# Patient Record
Sex: MALE | Race: WHITE | NOT HISPANIC OR LATINO | Employment: OTHER | URBAN - METROPOLITAN AREA
[De-identification: names, ages, dates, MRNs, and addresses within clinical notes are randomized per-mention and may not be internally consistent; named-entity substitution may affect disease eponyms.]

---

## 2019-10-07 ENCOUNTER — HOSPITAL ENCOUNTER (OUTPATIENT)
Facility: HOSPITAL | Age: 54
Setting detail: OBSERVATION
Discharge: HOME | End: 2019-10-08
Attending: INTERNAL MEDICINE | Admitting: INTERNAL MEDICINE

## 2019-10-07 ENCOUNTER — APPOINTMENT (OUTPATIENT)
Dept: CT IMAGING | Facility: HOSPITAL | Age: 54
End: 2019-10-07

## 2019-10-07 DIAGNOSIS — R42 DIZZINESS: Primary | ICD-10-CM

## 2019-10-07 LAB
ALBUMIN SERPL-MCNC: 4.2 G/DL (ref 3.5–4.8)
ALBUMIN/GLOB SERPL: 1.6 G/DL (ref 1–1.7)
ALP SERPL-CCNC: 60 U/L (ref 32–91)
ALT SERPL W P-5'-P-CCNC: 68 U/L (ref 17–63)
ANION GAP SERPL CALCULATED.3IONS-SCNC: 15.4 MMOL/L (ref 5–15)
APTT PPP: 24.6 SECONDS (ref 24–31)
AST SERPL-CCNC: 44 U/L (ref 15–41)
BASOPHILS # BLD AUTO: 0 10*3/MM3 (ref 0–0.2)
BASOPHILS NFR BLD AUTO: 1 % (ref 0–1.5)
BILIRUB SERPL-MCNC: 0.5 MG/DL (ref 0.3–1.2)
BILIRUB UR QL STRIP: NEGATIVE
BUN BLD-MCNC: 12 MG/DL (ref 8–20)
BUN/CREAT SERPL: 13.3 (ref 6.2–20.3)
CALCIUM SPEC-SCNC: 9 MG/DL (ref 8.9–10.3)
CHLORIDE SERPL-SCNC: 100 MMOL/L (ref 101–111)
CLARITY UR: CLEAR
CO2 SERPL-SCNC: 23 MMOL/L (ref 22–32)
COLOR UR: YELLOW
CREAT BLD-MCNC: 0.9 MG/DL (ref 0.7–1.2)
DEPRECATED RDW RBC AUTO: 44.2 FL (ref 37–54)
EOSINOPHIL # BLD AUTO: 0.1 10*3/MM3 (ref 0–0.4)
EOSINOPHIL NFR BLD AUTO: 1.5 % (ref 0.3–6.2)
ERYTHROCYTE [DISTWIDTH] IN BLOOD BY AUTOMATED COUNT: 13.9 % (ref 12.3–15.4)
GFR SERPL CREATININE-BSD FRML MDRD: 88 ML/MIN/1.73
GLOBULIN UR ELPH-MCNC: 2.7 GM/DL (ref 2.5–3.8)
GLUCOSE BLD-MCNC: 302 MG/DL (ref 65–99)
GLUCOSE BLDC GLUCOMTR-MCNC: 145 MG/DL (ref 70–105)
GLUCOSE UR STRIP-MCNC: ABNORMAL MG/DL
HBA1C MFR BLD: 8.8 % (ref 3.5–5.6)
HCT VFR BLD AUTO: 44.3 % (ref 37.5–51)
HGB BLD-MCNC: 15.3 G/DL (ref 13–17.7)
HGB UR QL STRIP.AUTO: NEGATIVE
INR PPP: 1.01 (ref 0.9–1.1)
KETONES UR QL STRIP: NEGATIVE
LEUKOCYTE ESTERASE UR QL STRIP.AUTO: NEGATIVE
LIPASE SERPL-CCNC: 43 U/L (ref 22–51)
LYMPHOCYTES # BLD AUTO: 1.1 10*3/MM3 (ref 0.7–3.1)
LYMPHOCYTES NFR BLD AUTO: 23.5 % (ref 19.6–45.3)
MCH RBC QN AUTO: 31.3 PG (ref 26.6–33)
MCHC RBC AUTO-ENTMCNC: 34.6 G/DL (ref 31.5–35.7)
MCV RBC AUTO: 90.5 FL (ref 79–97)
MONOCYTES # BLD AUTO: 0.3 10*3/MM3 (ref 0.1–0.9)
MONOCYTES NFR BLD AUTO: 7.4 % (ref 5–12)
NEUTROPHILS # BLD AUTO: 3.1 10*3/MM3 (ref 1.7–7)
NEUTROPHILS NFR BLD AUTO: 66.6 % (ref 42.7–76)
NITRITE UR QL STRIP: NEGATIVE
NRBC BLD AUTO-RTO: 0.1 /100 WBC (ref 0–0.2)
PH UR STRIP.AUTO: 5.5 [PH] (ref 5–8)
PLATELET # BLD AUTO: 134 10*3/MM3 (ref 140–450)
PMV BLD AUTO: 9.9 FL (ref 6–12)
POTASSIUM BLD-SCNC: 4.4 MMOL/L (ref 3.6–5.1)
PROT SERPL-MCNC: 6.9 G/DL (ref 6.1–7.9)
PROT UR QL STRIP: NEGATIVE
PROTHROMBIN TIME: 10.6 SECONDS (ref 9.6–11.7)
RBC # BLD AUTO: 4.89 10*6/MM3 (ref 4.14–5.8)
SODIUM BLD-SCNC: 134 MMOL/L (ref 136–144)
SP GR UR STRIP: 1.02 (ref 1–1.03)
TROPONIN I SERPL-MCNC: <0.03 NG/ML (ref 0–0.03)
UROBILINOGEN UR QL STRIP: ABNORMAL
WBC NRBC COR # BLD: 4.7 10*3/MM3 (ref 3.4–10.8)

## 2019-10-07 PROCEDURE — G0378 HOSPITAL OBSERVATION PER HR: HCPCS

## 2019-10-07 PROCEDURE — 0 IOPAMIDOL PER 1 ML: Performed by: INTERNAL MEDICINE

## 2019-10-07 PROCEDURE — 99220 PR INITIAL OBSERVATION CARE/DAY 70 MINUTES: CPT | Performed by: INTERNAL MEDICINE

## 2019-10-07 PROCEDURE — 85610 PROTHROMBIN TIME: CPT | Performed by: INTERNAL MEDICINE

## 2019-10-07 PROCEDURE — 83690 ASSAY OF LIPASE: CPT | Performed by: PHYSICIAN ASSISTANT

## 2019-10-07 PROCEDURE — 93005 ELECTROCARDIOGRAM TRACING: CPT | Performed by: PHYSICIAN ASSISTANT

## 2019-10-07 PROCEDURE — 85730 THROMBOPLASTIN TIME PARTIAL: CPT | Performed by: INTERNAL MEDICINE

## 2019-10-07 PROCEDURE — 81003 URINALYSIS AUTO W/O SCOPE: CPT | Performed by: PHYSICIAN ASSISTANT

## 2019-10-07 PROCEDURE — 70450 CT HEAD/BRAIN W/O DYE: CPT

## 2019-10-07 PROCEDURE — 85025 COMPLETE CBC W/AUTO DIFF WBC: CPT | Performed by: PHYSICIAN ASSISTANT

## 2019-10-07 PROCEDURE — 70496 CT ANGIOGRAPHY HEAD: CPT

## 2019-10-07 PROCEDURE — 80053 COMPREHEN METABOLIC PANEL: CPT | Performed by: PHYSICIAN ASSISTANT

## 2019-10-07 PROCEDURE — 99285 EMERGENCY DEPT VISIT HI MDM: CPT

## 2019-10-07 PROCEDURE — 84484 ASSAY OF TROPONIN QUANT: CPT | Performed by: PHYSICIAN ASSISTANT

## 2019-10-07 PROCEDURE — 70498 CT ANGIOGRAPHY NECK: CPT

## 2019-10-07 PROCEDURE — 83036 HEMOGLOBIN GLYCOSYLATED A1C: CPT | Performed by: PHYSICIAN ASSISTANT

## 2019-10-07 PROCEDURE — 82962 GLUCOSE BLOOD TEST: CPT

## 2019-10-07 RX ORDER — CARVEDILOL 25 MG/1
25 TABLET ORAL 2 TIMES DAILY WITH MEALS
Status: DISCONTINUED | OUTPATIENT
Start: 2019-10-07 | End: 2019-10-08 | Stop reason: HOSPADM

## 2019-10-07 RX ORDER — CLOPIDOGREL BISULFATE 75 MG/1
75 TABLET ORAL DAILY
Status: ON HOLD | COMMUNITY
End: 2019-10-08 | Stop reason: SDUPTHER

## 2019-10-07 RX ORDER — ATORVASTATIN CALCIUM 40 MG/1
40 TABLET, FILM COATED ORAL DAILY
COMMUNITY
End: 2019-10-31

## 2019-10-07 RX ORDER — ATORVASTATIN CALCIUM 40 MG/1
40 TABLET, FILM COATED ORAL DAILY
Status: DISCONTINUED | OUTPATIENT
Start: 2019-10-07 | End: 2019-10-08 | Stop reason: HOSPADM

## 2019-10-07 RX ORDER — SODIUM CHLORIDE 0.9 % (FLUSH) 0.9 %
10 SYRINGE (ML) INJECTION EVERY 12 HOURS SCHEDULED
Status: DISCONTINUED | OUTPATIENT
Start: 2019-10-07 | End: 2019-10-08 | Stop reason: HOSPADM

## 2019-10-07 RX ORDER — ENALAPRIL MALEATE 5 MG/1
10 TABLET ORAL 2 TIMES DAILY
Status: DISCONTINUED | OUTPATIENT
Start: 2019-10-07 | End: 2019-10-08

## 2019-10-07 RX ORDER — ENALAPRILAT 2.5 MG/2ML
1.25 INJECTION INTRAVENOUS EVERY 6 HOURS PRN
Status: DISCONTINUED | OUTPATIENT
Start: 2019-10-07 | End: 2019-10-08 | Stop reason: HOSPADM

## 2019-10-07 RX ORDER — ACETAMINOPHEN 325 MG/1
650 TABLET ORAL EVERY 6 HOURS PRN
Status: DISCONTINUED | OUTPATIENT
Start: 2019-10-07 | End: 2019-10-08 | Stop reason: HOSPADM

## 2019-10-07 RX ORDER — SODIUM CHLORIDE 0.9 % (FLUSH) 0.9 %
10 SYRINGE (ML) INJECTION AS NEEDED
Status: DISCONTINUED | OUTPATIENT
Start: 2019-10-07 | End: 2019-10-08

## 2019-10-07 RX ORDER — ENALAPRIL MALEATE 10 MG/1
10 TABLET ORAL 2 TIMES DAILY
Status: ON HOLD | COMMUNITY
End: 2019-10-08 | Stop reason: SDUPTHER

## 2019-10-07 RX ORDER — SODIUM CHLORIDE 0.9 % (FLUSH) 0.9 %
10 SYRINGE (ML) INJECTION EVERY 12 HOURS SCHEDULED
Status: DISCONTINUED | OUTPATIENT
Start: 2019-10-07 | End: 2019-10-08

## 2019-10-07 RX ORDER — ASPIRIN 81 MG/1
81 TABLET ORAL DAILY
Status: ON HOLD | COMMUNITY
End: 2019-10-08 | Stop reason: SDUPTHER

## 2019-10-07 RX ORDER — SODIUM CHLORIDE 0.9 % (FLUSH) 0.9 %
10 SYRINGE (ML) INJECTION AS NEEDED
Status: DISCONTINUED | OUTPATIENT
Start: 2019-10-07 | End: 2019-10-08 | Stop reason: HOSPADM

## 2019-10-07 RX ORDER — CARVEDILOL 25 MG/1
25 TABLET ORAL 2 TIMES DAILY WITH MEALS
COMMUNITY

## 2019-10-07 RX ADMIN — Medication 10 ML: at 19:52

## 2019-10-07 RX ADMIN — ENALAPRIL MALEATE 10 MG: 5 TABLET ORAL at 19:51

## 2019-10-07 RX ADMIN — SODIUM CHLORIDE 1000 ML: 900 INJECTION, SOLUTION INTRAVENOUS at 10:43

## 2019-10-07 RX ADMIN — IOPAMIDOL 100 ML: 755 INJECTION, SOLUTION INTRAVENOUS at 14:59

## 2019-10-07 RX ADMIN — ACETAMINOPHEN 650 MG: 325 TABLET ORAL at 18:18

## 2019-10-07 RX ADMIN — CARVEDILOL 25 MG: 25 TABLET, FILM COATED ORAL at 18:18

## 2019-10-07 RX ADMIN — ATORVASTATIN CALCIUM 40 MG: 40 TABLET, FILM COATED ORAL at 18:18

## 2019-10-07 RX ADMIN — Medication 10 ML: at 19:53

## 2019-10-07 NOTE — ED PROVIDER NOTES
Subjective   History:  Patient is a 54-year-old male presents to the ER with dizziness and nausea.  He reports he had 2 episodes yesterday that lasted less than 10 seconds each 1 while he was standing walking downstairs the other while he rolled over on the couch.  He reports this morning he woke up and he still felt as though he might get dizzy and passed out.  He also reports associated nausea but no episodes of emesis.  Does report that he has a defibrillator in place.    Onset: 2 days  Location: generalized  Duration: intermittent  Character: dizziness/nausea  Aggravating/Alleviating factors: None  Radiation None  Severity: mild               Review of Systems   Constitutional: Negative.    HENT: Negative.    Respiratory: Negative.    Gastrointestinal: Positive for nausea. Negative for vomiting.   Genitourinary: Negative.    Musculoskeletal: Negative.    Skin: Negative.    Neurological: Positive for dizziness.   Psychiatric/Behavioral: Negative.        Past Medical History:   Diagnosis Date   • Diabetes mellitus (CMS/[facility])    • Heart disease        No Known Allergies    Past Surgical History:   Procedure Laterality Date   • INTERNAL CARDIAC DEFIBRILLATOR INSERTION Left        History reviewed. No pertinent family history.    Social History     Socioeconomic History   • Marital status:      Spouse name: Not on file   • Number of children: Not on file   • Years of education: Not on file   • Highest education level: Not on file   Tobacco Use   • Smoking status: Never Smoker   • Smokeless tobacco: Never Used   Substance and Sexual Activity   • Alcohol use: Yes     Comment: occasional    • Drug use: No   • Sexual activity: Defer           Objective   Physical Exam   Constitutional: He is oriented to person, place, and time. He appears well-developed and well-nourished.   HENT:   Head: Normocephalic and atraumatic.   Eyes: Pupils are equal, round, and reactive to light.   Neck: Normal range of motion.    Cardiovascular: Normal rate and regular rhythm.   No carotid bruits    Pulmonary/Chest: Effort normal and breath sounds normal.   Musculoskeletal: Normal range of motion.   Neurological: He is alert and oriented to person, place, and time. No cranial nerve deficit or sensory deficit. He exhibits normal muscle tone. Coordination normal.   Skin: Skin is warm and dry.   Psychiatric: He has a normal mood and affect. His behavior is normal.       Procedures           ED Course        Ct Head Without Contrast    Result Date: 10/7/2019   1. 6 mm hyperdense lesion in the posterior aspect of the right centrum semiovale. Diagnostic considerations are discussed above. 2. I would recommend an MRI of the brain with and without contrast for follow-up.  Electronically Signed By-Hasmukh Streeter On:10/7/2019 11:01 AM This report was finalized on [id number] by  Hasmukh Streeter, .    Labs Reviewed   COMPREHENSIVE METABOLIC PANEL - Abnormal; Notable for the following components:       Result Value    Glucose 302 (*)     Sodium 134 (*)     Chloride 100 (*)     ALT (SGPT) 68 (*)     AST (SGOT) 44 (*)     Anion Gap 15.4 (*)     All other components within normal limits   URINALYSIS W/ CULTURE IF INDICATED - Abnormal; Notable for the following components:    Glucose, UA >=1000 mg/dL (3+) (*)     All other components within normal limits    Narrative:     Urine microscopic not indicated.   CBC WITH AUTO DIFFERENTIAL - Abnormal; Notable for the following components:    Platelets 134 (*)     All other components within normal limits   LIPASE - Normal   TROPONIN (IN-HOUSE) - Normal    Narrative:     Troponin I Reference Range:    0.00-0.03  Negative.  Repeat testing in 4-6 hours if clinically indicated.    0.04-0.29  Suspicious for myocardial injury. Serial measurements and clinical  correlation may be necessary to confirm or exclude diagnosis of acute  coronary syndrome.  Repeat testing in 4-6 hours if indicated.     >0.29 Consistent with  myocardial injury.  Recommend clinical and laboratory correlation.     Results my be falsely decreased if patient taking Biotin.    CBC AND DIFFERENTIAL    Narrative:     The following orders were created for panel order CBC & Differential.  Procedure                               Abnormality         Status                     ---------                               -----------         ------                     CBC Auto Differential[[id number]]        Abnormal            Final result                 Please view results for these tests on the individual orders.     Medications   sodium chloride 0.9 % flush 10 mL (not administered)   sodium chloride 0.9 % bolus 1,000 mL (0 mL Intravenous Stopped 10/7/19 1200)               MDM  Number of Diagnoses or Management Options  Dizziness:   Diagnosis management comments: DISPOSITION:   Chart Review:  Comorbidity:  has a past medical history of Diabetes mellitus (CMS/[facility]) and Heart disease.  Differentials:this list is not all inclusive and does not constitute the entirety of considered causes --> Intracranial abnormality, CAD, hypotension,  ECG: interpreted by ER physician and reviewed by myself: Sinus rhythm  Labs: Glucose 302    Imaging: Was interpreted by physician and reviewed by myself:  Ct Head Without Contrast    Result Date: 10/7/2019   1. 6 mm hyperdense lesion in the posterior aspect of the right centrum semiovale. Diagnostic considerations are discussed above. 2. I would recommend an MRI of the brain with and without contrast for follow-up.  Electronically Signed By-Hasmukh Streeter On:10/7/2019 11:01 AM This report was finalized on [id number] by  Hasmukh Streeter, .      Disposition/Treatment:  Patient is a 54-year-old male presents to the ER with dizziness and headache for the past 2 days.  Patient's orthostats were negative.  Patient had a CT of his head which showed a 6 mm hyperdense lesion.  Dr. Henderson and I evaluated the patient he had no neurological deficits.   Patient's defibrillator is not MRI compatible.  I spoke with Dr. Simpson who recommended admittance with CT of his head and repeat in the morning. He will follow the patient in the hospital.  He was admitted to Dr. Nascimento patient was stable and in agreement with plan.       Amount and/or Complexity of Data Reviewed  Clinical lab tests: reviewed  Tests in the radiology section of CPT®: reviewed  Tests in the medicine section of CPT®: reviewed    Patient Progress  Patient progress: stable      Final diagnoses:   Dizziness              Julieta Noland PA-C  10/07/19 1412       Julieta Noland PA-C  10/07/19 1415

## 2019-10-07 NOTE — PLAN OF CARE
Dr Garcia is aware of patient and has reviewed the CT Head, CTA head and CTA neck. Pt has what appears to be a small intracranial bleed. We will follow with a repeat CT Head in the morning and will evaluate the patient in the morning.  Please call on call Neurosurgery service for any change in mental status and obtain a repeat CT Head.

## 2019-10-07 NOTE — H&P
[facility] Medicine Services            Primary Care Provider:  Rogerio Gordillo MD    Patient Care Team:  Rogerio Gordillo MD as PCP - General    CHIEF COMPLAINT:     Chief Complaint   Patient presents with   • Dizziness       Dizziness    HISTORY OF PRESENT ILLNESS:    HPI  54-year-old male with history of   hypertension  Dyslipidemia  CAD  CHF  DM2  Status post ICD    Presenting with dizziness without spinning sensation, started yesterday still with nausea without vomiting.  Denies any headache ear symptoms or tenderness.  He had an episodes lasting about 10 seconds.  Postinfectious his change of position from sitting position and lying down in bed with movement in the bed.  He reports no use of anticoagulants but he takes aspirin Plavix for cardiac disease.  He has pacemaker/ICD in place.  He denies any chest symptoms.  CT head in the ER showed evidence of6 mm hyperdense lesion in the posterior aspect of the right centrum  semiovale.  CT angiogram showed bibasilar tip aneurysm 5.5 mm  Neurologist Dr. Nobles called from ER who recommended admission for further evaluation morning with repeat CT scan.  He tolerates hemorrhagic stroke.  Patient cannot do MRI secondary to ICD/pacemaker  Patient denies abdominal pain or vomiting or diarrhea.  He reports taking only metformin today.  He reports that he has not taken his aspirin or Plavix today.    Past Medical History:   Diagnosis Date   • Diabetes mellitus (CMS/H[facility])    • Heart disease        Past Surgical History:   Procedure Laterality Date   • INTERNAL CARDIAC DEFIBRILLATOR INSERTION Left        History reviewed. No pertinent family history.    Social History     Tobacco Use   • Smoking status: Never Smoker   • Smokeless tobacco: Never Used   Substance Use Topics   • Alcohol use: Yes     Comment: occasional    • Drug use: No       Medications Prior to Admission   Medication Sig Dispense Refill Last Dose   • aspirin 81 MG EC tablet Take  "81 mg by mouth Daily.   10/6/2019 at Unknown time   • atorvastatin (LIPITOR) 40 MG tablet Take 40 mg by mouth Daily.   10/6/2019 at Unknown time   • carvedilol (COREG) 25 MG tablet Take 25 mg by mouth 2 (Two) Times a Day With Meals.   10/6/2019 at Unknown time   • clopidogrel (PLAVIX) 75 MG tablet Take 75 mg by mouth Daily.   10/6/2019 at Unknown time   • enalapril (VASOTEC) 10 MG tablet Take 10 mg by mouth 2 (Two) Times a Day.   10/6/2019 at Unknown time   • metFORMIN (GLUCOPHAGE) 500 MG tablet Take 500 mg by mouth 2 (Two) Times a Day With Meals.   10/7/2019 at Unknown time       Allergies:  Patient has no known allergies.      There is no immunization history on file for this patient.        REVIEW OF SYSTEMS:     ROS    Vital Signs  Temp:  [97.8 °F (36.6 °C)] 97.8 °F (36.6 °C)  Heart Rate:  [69-77] 74  Resp:  [18] 18  BP: (108-129)/(66-82) 115/71    Flowsheet Rows      First Filed Value   Admission Height  170.2 cm (67\") Documented at 10/07/2019 1001   Admission Weight  95.9 kg (211 lb 6.7 oz) Documented at 10/07/2019 1001           Physical Exam:    Physical Exam   Constitutional: He is oriented to person, place, and time. He appears well-developed and well-nourished. No distress.   HENT:   Head: Normocephalic and atraumatic.   Right Ear: External ear normal.   Left Ear: External ear normal.   Nose: Nose normal.   Mouth/Throat: Oropharynx is clear and moist. No oropharyngeal exudate.   Eyes: Conjunctivae and EOM are normal. Pupils are equal, round, and reactive to light. Right eye exhibits no discharge. Left eye exhibits no discharge. No scleral icterus.   Neck: Normal range of motion. No JVD present. No tracheal deviation present. No thyromegaly present.   Cardiovascular: Normal rate, regular rhythm, normal heart sounds and intact distal pulses. Exam reveals no gallop and no friction rub.   No murmur heard.  Pulmonary/Chest: Effort normal and breath sounds normal. No stridor. No respiratory distress. He has no " wheezes. He has no rales. He exhibits no tenderness.   Abdominal: Soft. Bowel sounds are normal. He exhibits no distension and no mass. There is no tenderness. There is no rebound and no guarding. No hernia.   Musculoskeletal: Normal range of motion. He exhibits no edema, tenderness or deformity.   Lymphadenopathy:     He has no cervical adenopathy.   Neurological: He is alert and oriented to person, place, and time. No cranial nerve deficit or sensory deficit. He exhibits normal muscle tone. Coordination normal.   Skin: Skin is warm and dry. No rash noted. He is not diaphoretic. No erythema.   Psychiatric: He has a normal mood and affect. His behavior is normal.   Nursing note and vitals reviewed.      Emotional Behavior:   Normal   Debilities:  Age appropriate      Results Review:      I reviewed the patient's new clinical results.    Lab Results (most recent)     Procedure Component Value Units Date/Time    Hemoglobin A1c [[id number]]  (Abnormal) Collected:  10/07/19 1022    Specimen:  Blood Updated:  10/07/19 1539     Hemoglobin A1C 8.8 %     Narrative:       Hemoglobin A1C Reference Range:    <5.7 %        Normal  5.7-6.4 %     Increased risk for diabetes  > 6.4 %        Diabetes       These guidelines have been recommended by the American Diabetic Association for Hgb A1c.      The following 2010 guidelines have been recommended by the American Diabetes Association for Hemoglobin A1c.    HBA1c 5.7-6.4% Increased risk for future diabetes (pre-diabetes)  HBA1c     >6.4% Diabetes    Urinalysis With Culture If Indicated - Urine, Clean Catch [[id number]]  (Abnormal) Collected:  10/07/19 1031    Specimen:  Urine, Clean Catch Updated:  10/07/19 1102     Color, UA Yellow     Appearance, UA Clear     pH, UA 5.5     Specific Gravity, UA 1.018     Glucose, UA >=1000 mg/dL (3+)     Ketones, UA Negative     Bilirubin, UA Negative     Blood, UA Negative     Protein, UA Negative     Leuk Esterase, UA Negative     Nitrite, UA  Negative     Urobilinogen, UA 0.2 E.U./dL    Narrative:       Urine microscopic not indicated.    Troponin [[id number]]  (Normal) Collected:  10/07/19 1022    Specimen:  Blood Updated:  10/07/19 1054     Troponin I <0.030 ng/mL     Narrative:       Troponin I Reference Range:    0.00-0.03  Negative.  Repeat testing in 4-6 hours if clinically indicated.    0.04-0.29  Suspicious for myocardial injury. Serial measurements and clinical  correlation may be necessary to confirm or exclude diagnosis of acute  coronary syndrome.  Repeat testing in 4-6 hours if indicated.     >0.29 Consistent with myocardial injury.  Recommend clinical and laboratory correlation.     Results my be falsely decreased if patient taking Biotin.     Comprehensive Metabolic Panel [[id number]]  (Abnormal) Collected:  10/07/19 1022    Specimen:  Blood Updated:  10/07/19 1053     Glucose 302 mg/dL      BUN 12 mg/dL      Creatinine 0.90 mg/dL      Sodium 134 mmol/L      Potassium 4.4 mmol/L      Chloride 100 mmol/L      CO2 23.0 mmol/L      Calcium 9.0 mg/dL      Total Protein 6.9 g/dL      Albumin 4.20 g/dL      ALT (SGPT) 68 U/L      AST (SGOT) 44 U/L      Alkaline Phosphatase 60 U/L      Total Bilirubin 0.5 mg/dL      eGFR Non African Amer 88 mL/min/1.73      Globulin 2.7 gm/dL      A/G Ratio 1.6 g/dL      BUN/Creatinine Ratio 13.3     Anion Gap 15.4 mmol/L     Lipase [[id number]]  (Normal) Collected:  10/07/19 1022    Specimen:  Blood Updated:  10/07/19 1053     Lipase 43 U/L     CBC & Differential [[id number]] Collected:  10/07/19 1022    Specimen:  Blood Updated:  10/07/19 1034    Narrative:       The following orders were created for panel order CBC & Differential.  Procedure                               Abnormality         Status                     ---------                               -----------         ------                     CBC Auto Differential[[id number]]        Abnormal            Final result                 Please view results for  these tests on the individual orders.    CBC Auto Differential [[id number]]  (Abnormal) Collected:  10/07/19 1022    Specimen:  Blood Updated:  10/07/19 1034     WBC 4.70 10*3/mm3      RBC 4.89 10*6/mm3      Hemoglobin 15.3 g/dL      Hematocrit 44.3 %      MCV 90.5 fL      MCH 31.3 pg      MCHC 34.6 g/dL      RDW 13.9 %      RDW-SD 44.2 fl      MPV 9.9 fL      Platelets 134 10*3/mm3      Neutrophil % 66.6 %      Lymphocyte % 23.5 %      Monocyte % 7.4 %      Eosinophil % 1.5 %      Basophil % 1.0 %      Neutrophils, Absolute 3.10 10*3/mm3      Lymphocytes, Absolute 1.10 10*3/mm3      Monocytes, Absolute 0.30 10*3/mm3      Eosinophils, Absolute 0.10 10*3/mm3      Basophils, Absolute 0.00 10*3/mm3      nRBC 0.1 /100 WBC           Imaging Results (most recent)     Procedure Component Value Units Date/Time    CT Angiogram Head With & Without Contrast [[id number]] Updated:  10/07/19 1458    CT Angiogram Neck With & Without Contrast [[id number]] Updated:  10/07/19 1458    CT Head Without Contrast [[id number]] Collected:  10/07/19 1058     Updated:  10/07/19 1104    Narrative:          DATE OF EXAM:  10/7/2019 10:48 AM     PROCEDURE:   CT HEAD WO CONTRAST-     INDICATIONS:   Syncopal episode, nausea and dizziness, hypertension.     COMPARISON:  No Comparisons Available     TECHNIQUE:   Routine transaxial cuts were obtained through the head without the  administration of contrast. Automated exposure control and iterative  reconstruction methods were used.      FINDINGS:  The cerebral sulci, fissures, ventricles, and basal cisterns are within  range of normal. There is a 6 mm hyperdense lesion in the posterior  aspect of the right centrum semiovale. This is in the deep white matter  of the right parietal lobe. There is no surrounding vasogenic edema.  This could represent a dystrophic calcification from a previous ischemic  event, a small acute hyperdense hemorrhage, or a neoplastic process such  as lymphoma or a neuroendocrine  tumor. I would recommend an MRI of the  brain with and without contrast for follow-up. There is no midline shift  or suspicious extra-axial fluid collections. The orbital contents are  normal. The paranasal and mastoid sinuses are clear. The calvarium is  unremarkable.        Impression:          1. 6 mm hyperdense lesion in the posterior aspect of the right centrum  semiovale. Diagnostic considerations are discussed above.  2. I would recommend an MRI of the brain with and without contrast for  follow-up.     Electronically Signed By-Hasmukh Streeter On:10/7/2019 11:01 AM  This report was finalized on [id number] by  Hasmukh Streeter, .        reviewed    ECG/EMG Results (most recent)     Procedure Component Value Units Date/Time    ECG 12 Lead [[id number]] Collected:  10/07/19 1023     Updated:  10/07/19 1025    Narrative:       HEART RATE= 70  bpm  RR Interval= 856  ms  WV Interval= 147  ms  P Horizontal Axis= 12  deg  P Front Axis= 35  deg  QRSD Interval= 108  ms  QT Interval= 406  ms  QRS Axis= 35  deg  T Wave Axis= -26  deg  - ABNORMAL ECG -  Sinus rhythm  Inferior infarct, old  Electronically Signed By:   Date and Time of Study: 2019-10-07 10:23:48        reviewed              CT Head Without Contrast  Narrative:    DATE OF EXAM:  10/7/2019 10:48 AM     PROCEDURE:   CT HEAD WO CONTRAST-     INDICATIONS:   Syncopal episode, nausea and dizziness, hypertension.     COMPARISON:  No Comparisons Available     TECHNIQUE:   Routine transaxial cuts were obtained through the head without the  administration of contrast. Automated exposure control and iterative  reconstruction methods were used.      FINDINGS:  The cerebral sulci, fissures, ventricles, and basal cisterns are within  range of normal. There is a 6 mm hyperdense lesion in the posterior  aspect of the right centrum semiovale. This is in the deep white matter  of the right parietal lobe. There is no surrounding vasogenic edema.  This could represent a dystrophic  calcification from a previous ischemic  event, a small acute hyperdense hemorrhage, or a neoplastic process such  as lymphoma or a neuroendocrine tumor. I would recommend an MRI of the  brain with and without contrast for follow-up. There is no midline shift  or suspicious extra-axial fluid collections. The orbital contents are  normal. The paranasal and mastoid sinuses are clear. The calvarium is  unremarkable.      Impression:    1. 6 mm hyperdense lesion in the posterior aspect of the right centrum  semiovale. Diagnostic considerations are discussed above.  2. I would recommend an MRI of the brain with and without contrast for  follow-up.     Electronically Signed By-Hasmukh Streeter On:10/7/2019 11:01 AM  This report was finalized on [id number] by  Hasmukh Streeter, .      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  Hold off on aspirin/Plavix  Stat PT/INR  Repeat CT scan in the morning  Monitor neurological status closely per protocol  Consult neurosurgery neurologist  DVT prophylaxis with SCD  Discussed with patient and family    Víctor Nascimento MD  10/07/19  3:42 PM

## 2019-10-07 NOTE — PLAN OF CARE
Problem: Patient Care Overview  Goal: Plan of Care Review  Outcome: Ongoing (interventions implemented as appropriate)   10/07/19 1741   Coping/Psychosocial   Plan of Care Reviewed With patient   Plan of Care Review   Progress improving   OTHER   Outcome Summary Patient admitted to unit today       Problem: Stroke (Hemorrhagic) (Adult)  Goal: Signs and Symptoms of Listed Potential Problems Will be Absent, Minimized or Managed (Stroke)  Outcome: Ongoing (interventions implemented as appropriate)   10/07/19 1741   Goal/Outcome Evaluation   Problems Assessed (Stroke (Hemorrhagic)) all   Problems Present (Stroke (Hemorrhagic)) none

## 2019-10-08 ENCOUNTER — APPOINTMENT (OUTPATIENT)
Dept: CT IMAGING | Facility: HOSPITAL | Age: 54
End: 2019-10-08

## 2019-10-08 ENCOUNTER — HOSPITAL ENCOUNTER (OUTPATIENT)
Dept: CARDIOLOGY | Facility: HOSPITAL | Age: 54
Discharge: HOME | End: 2019-10-08

## 2019-10-08 VITALS
HEIGHT: 67 IN | DIASTOLIC BLOOD PRESSURE: 66 MMHG | BODY MASS INDEX: 32.96 KG/M2 | WEIGHT: 210 LBS | SYSTOLIC BLOOD PRESSURE: 92 MMHG

## 2019-10-08 VITALS
DIASTOLIC BLOOD PRESSURE: 71 MMHG | OXYGEN SATURATION: 94 % | TEMPERATURE: 97.4 F | HEIGHT: 67 IN | WEIGHT: 210 LBS | BODY MASS INDEX: 32.96 KG/M2 | HEART RATE: 68 BPM | SYSTOLIC BLOOD PRESSURE: 128 MMHG | RESPIRATION RATE: 16 BRPM

## 2019-10-08 LAB
ALBUMIN SERPL-MCNC: 3.7 G/DL (ref 3.5–4.8)
ALBUMIN/GLOB SERPL: 1.6 G/DL (ref 1–1.7)
ALP SERPL-CCNC: 54 U/L (ref 32–91)
ALT SERPL W P-5'-P-CCNC: 67 U/L (ref 17–63)
ANION GAP SERPL CALCULATED.3IONS-SCNC: 15.9 MMOL/L (ref 5–15)
ARTICHOKE IGE QN: 164 MG/DL (ref 0–100)
AST SERPL-CCNC: 51 U/L (ref 15–41)
BASOPHILS # BLD AUTO: 0 10*3/MM3 (ref 0–0.2)
BASOPHILS NFR BLD AUTO: 0.6 % (ref 0–1.5)
BILIRUB SERPL-MCNC: 0.6 MG/DL (ref 0.3–1.2)
BUN BLD-MCNC: 13 MG/DL (ref 8–20)
BUN/CREAT SERPL: 14.4 (ref 6.2–20.3)
CALCIUM SPEC-SCNC: 8.6 MG/DL (ref 8.9–10.3)
CHLORIDE SERPL-SCNC: 99 MMOL/L (ref 101–111)
CHOLEST SERPL-MCNC: 349 MG/DL
CO2 SERPL-SCNC: 25 MMOL/L (ref 22–32)
CREAT BLD-MCNC: 0.9 MG/DL (ref 0.7–1.2)
DEPRECATED RDW RBC AUTO: 44.2 FL (ref 37–54)
EOSINOPHIL # BLD AUTO: 0.1 10*3/MM3 (ref 0–0.4)
EOSINOPHIL NFR BLD AUTO: 1.9 % (ref 0.3–6.2)
ERYTHROCYTE [DISTWIDTH] IN BLOOD BY AUTOMATED COUNT: 13.9 % (ref 12.3–15.4)
GFR SERPL CREATININE-BSD FRML MDRD: 88 ML/MIN/1.73
GLOBULIN UR ELPH-MCNC: 2.3 GM/DL (ref 2.5–3.8)
GLUCOSE BLD-MCNC: 166 MG/DL (ref 65–99)
GLUCOSE BLDC GLUCOMTR-MCNC: 192 MG/DL (ref 70–105)
GLUCOSE BLDC GLUCOMTR-MCNC: 220 MG/DL (ref 70–105)
HCT VFR BLD AUTO: 39.2 % (ref 37.5–51)
HDLC SERPL QL: 10.91
HDLC SERPL-MCNC: 32 MG/DL
HGB BLD-MCNC: 13.9 G/DL (ref 13–17.7)
LDLC/HDLC SERPL: 3.76 {RATIO}
LYMPHOCYTES # BLD AUTO: 1.8 10*3/MM3 (ref 0.7–3.1)
LYMPHOCYTES NFR BLD AUTO: 35 % (ref 19.6–45.3)
MCH RBC QN AUTO: 32 PG (ref 26.6–33)
MCHC RBC AUTO-ENTMCNC: 35.5 G/DL (ref 31.5–35.7)
MCV RBC AUTO: 90 FL (ref 79–97)
MONOCYTES # BLD AUTO: 0.4 10*3/MM3 (ref 0.1–0.9)
MONOCYTES NFR BLD AUTO: 8 % (ref 5–12)
NEUTROPHILS # BLD AUTO: 2.9 10*3/MM3 (ref 1.7–7)
NEUTROPHILS NFR BLD AUTO: 54.5 % (ref 42.7–76)
NRBC BLD AUTO-RTO: 0.1 /100 WBC (ref 0–0.2)
PLATELET # BLD AUTO: 127 10*3/MM3 (ref 140–450)
PMV BLD AUTO: 9.9 FL (ref 6–12)
POTASSIUM BLD-SCNC: 3.9 MMOL/L (ref 3.6–5.1)
PROT SERPL-MCNC: 6 G/DL (ref 6.1–7.9)
RBC # BLD AUTO: 4.35 10*6/MM3 (ref 4.14–5.8)
SODIUM BLD-SCNC: 136 MMOL/L (ref 136–144)
TRIGL SERPL-MCNC: 983 MG/DL
TSH SERPL DL<=0.05 MIU/L-ACNC: 3.37 UIU/ML (ref 0.34–5.6)
VIT B12 BLD-MCNC: 204 PG/ML (ref 180–914)
VLDLC SERPL-MCNC: 196.6 MG/DL
WBC NRBC COR # BLD: 5.3 10*3/MM3 (ref 3.4–10.8)

## 2019-10-08 PROCEDURE — 70450 CT HEAD/BRAIN W/O DYE: CPT

## 2019-10-08 PROCEDURE — 82607 VITAMIN B-12: CPT | Performed by: INTERNAL MEDICINE

## 2019-10-08 PROCEDURE — 84443 ASSAY THYROID STIM HORMONE: CPT | Performed by: INTERNAL MEDICINE

## 2019-10-08 PROCEDURE — G0378 HOSPITAL OBSERVATION PER HR: HCPCS

## 2019-10-08 PROCEDURE — 82962 GLUCOSE BLOOD TEST: CPT

## 2019-10-08 PROCEDURE — 92523 SPEECH SOUND LANG COMPREHEN: CPT

## 2019-10-08 PROCEDURE — 99217 PR OBSERVATION CARE DISCHARGE MANAGEMENT: CPT | Performed by: INTERNAL MEDICINE

## 2019-10-08 PROCEDURE — 97161 PT EVAL LOW COMPLEX 20 MIN: CPT

## 2019-10-08 PROCEDURE — 96372 THER/PROPH/DIAG INJ SC/IM: CPT

## 2019-10-08 PROCEDURE — 93306 TTE W/DOPPLER COMPLETE: CPT

## 2019-10-08 PROCEDURE — 99204 OFFICE O/P NEW MOD 45 MIN: CPT | Performed by: PHYSICIAN ASSISTANT

## 2019-10-08 PROCEDURE — 80061 LIPID PANEL: CPT | Performed by: INTERNAL MEDICINE

## 2019-10-08 PROCEDURE — 80053 COMPREHEN METABOLIC PANEL: CPT | Performed by: INTERNAL MEDICINE

## 2019-10-08 PROCEDURE — 25010000002 CYANOCOBALAMIN PER 1000 MCG: Performed by: NURSE PRACTITIONER

## 2019-10-08 PROCEDURE — 99214 OFFICE O/P EST MOD 30 MIN: CPT | Performed by: NURSE PRACTITIONER

## 2019-10-08 PROCEDURE — 25010000002 SULFUR HEXAFLUORIDE MICROSPH 60.7-25 MG RECONSTITUTED SUSPENSION: Performed by: INTERNAL MEDICINE

## 2019-10-08 PROCEDURE — 63710000001 INSULIN LISPRO (HUMAN) PER 5 UNITS: Performed by: INTERNAL MEDICINE

## 2019-10-08 PROCEDURE — 85025 COMPLETE CBC W/AUTO DIFF WBC: CPT | Performed by: INTERNAL MEDICINE

## 2019-10-08 RX ORDER — ENALAPRIL MALEATE 5 MG/1
10 TABLET ORAL
Status: DISCONTINUED | OUTPATIENT
Start: 2019-10-09 | End: 2019-10-08 | Stop reason: HOSPADM

## 2019-10-08 RX ORDER — NICOTINE POLACRILEX 4 MG
15 LOZENGE BUCCAL
Status: DISCONTINUED | OUTPATIENT
Start: 2019-10-08 | End: 2019-10-08 | Stop reason: HOSPADM

## 2019-10-08 RX ORDER — DEXTROSE MONOHYDRATE 25 G/50ML
25 INJECTION, SOLUTION INTRAVENOUS
Status: DISCONTINUED | OUTPATIENT
Start: 2019-10-08 | End: 2019-10-08 | Stop reason: HOSPADM

## 2019-10-08 RX ORDER — ENALAPRIL MALEATE 10 MG/1
10 TABLET ORAL DAILY
Start: 2019-10-08 | End: 2021-11-01

## 2019-10-08 RX ORDER — ASPIRIN 81 MG/1
81 TABLET ORAL DAILY
Start: 2019-10-09

## 2019-10-08 RX ORDER — CYANOCOBALAMIN 1000 UG/ML
1000 INJECTION, SOLUTION INTRAMUSCULAR; SUBCUTANEOUS ONCE
Status: COMPLETED | OUTPATIENT
Start: 2019-10-08 | End: 2019-10-08

## 2019-10-08 RX ORDER — LANOLIN ALCOHOL/MO/W.PET/CERES
1000 CREAM (GRAM) TOPICAL DAILY
Status: DISCONTINUED | OUTPATIENT
Start: 2019-10-09 | End: 2019-10-08 | Stop reason: HOSPADM

## 2019-10-08 RX ORDER — CLOPIDOGREL BISULFATE 75 MG/1
75 TABLET ORAL DAILY
Start: 2019-10-09 | End: 2022-06-30 | Stop reason: SDUPTHER

## 2019-10-08 RX ADMIN — CYANOCOBALAMIN 1000 MCG: 1000 INJECTION, SOLUTION INTRAMUSCULAR at 13:28

## 2019-10-08 RX ADMIN — Medication 10 ML: at 08:26

## 2019-10-08 RX ADMIN — ATORVASTATIN CALCIUM 40 MG: 40 TABLET, FILM COATED ORAL at 08:22

## 2019-10-08 RX ADMIN — ENALAPRIL MALEATE 10 MG: 5 TABLET ORAL at 08:23

## 2019-10-08 RX ADMIN — ACETAMINOPHEN 650 MG: 325 TABLET ORAL at 08:22

## 2019-10-08 RX ADMIN — INSULIN LISPRO 3 UNITS: 100 INJECTION, SOLUTION INTRAVENOUS; SUBCUTANEOUS at 13:28

## 2019-10-08 RX ADMIN — CARVEDILOL 25 MG: 25 TABLET, FILM COATED ORAL at 08:22

## 2019-10-08 RX ADMIN — SULFUR HEXAFLUORIDE 2 ML: KIT at 15:30

## 2019-10-08 NOTE — THERAPY EVALUATION
Acute Care - Speech Language Pathology Initial Evaluation   Adriel     Patient Name: Herve Gill  : 1965  MRN: [id number]  Today's Date: 10/8/2019               Admit Date: 10/7/2019     Visit Dx:    ICD-10-CM ICD-9-CM   1. Dizziness R42 780.4     Patient Active Problem List   Diagnosis   • Dizziness     Past Medical History:   Diagnosis Date   • Diabetes mellitus (CMS/HCC)    • Heart disease      Past Surgical History:   Procedure Laterality Date   • INTERNAL CARDIAC DEFIBRILLATOR INSERTION Left         SLP EVALUATION (last 72 hours)      SLP SLC Evaluation     Row Name 10/08/19 1500          Patient Profile Reviewed  yes  -    Pertinent History Of Current Problem  Pt admitted with dizziness, CT noted to demonstrate possible micro hemorrhage. Neurosurgery consulted with no surgical interventions recommended at this time, recommending outpatient follow up. Pt described primary symptoms to incude dizziness & headache.     -    Prior Level of Function-Communication  WFL  -    Plans/Goals Discussed with  patient;spouse/S.O.;patient and family  -    Patient's Goals for Discharge  return to home  -          Additional Documentation  Pain Scale: Numbers Pre/Post-Treatment (Group)  -          Pain Scale: Numbers, Pretreatment  0/10 - no pain  -    Pain Scale: Numbers, Post-Treatment  0/10 - no pain  -          Pain: FACES Scale, Pretreatment  --  -          Comprehension Assessment/Intervention  Auditory Comprehension  -          Auditory Comprehension (Communication)  WNL  -    Auditory Comprehension Communication, Comment  Pt follows 2-step complex commands. Appropriate to conversation. No indications receptive aphasia  -          Expression Assessment/Intervention  verbal expression  -          Verbal Expression  WNL  -    Verbal Expression, Comment  Appropriate to conversation. No paraphasias, neologisms, or other indications expressive/Broca's aphasia.   -          Oral  Motor Structure and Function  WNL  -          Oral Motor General Assessment  WFL  -    Oral Motor, Comment  No oral  motor weakness or asymmetry  -          Motor Speech Function  WNL  -    Motor Speech, Comment  No consonant imprecision or dysarthria. NIH remains 0. Pt completed diadochokinetic productions without difficulty. Conversational speech intelligibility at 100%   -          Cognitive Function (Cognition)  WNL  -    Cognition, Comment  Immediate & delayed recall 100% accurate. Pt is extensively oriented (self, date, month, year, place, specific medical situation). Pt answered multi-step problem solving questions for time & medication management related to ADLs with 100% accuracy. Single error was independendently self corrected. Pt reports daily ADLs to include driving, taking child to school, cooking. Pt completed a verbal sequencing task for cooking with verbose speech and 100% accuracy. No cognitive deficits identified upon informal testing at this time. Pt was informed regarding different presentations of cognitive linguistic deficits that can occur once returning to ADLs & he was informed that outpatient ST services are available if issues identified once return to home.   -          SLP Diagnosis  Pt demonstrates speech, language, & cognitive linguistic skills which appear WFL on informal testing this date. No skilled ST appears warranted at this time. Please reconsult if services indicated  -[facility] Criteria for Skilled Therapy Interventions Met  no problems identified which require skilled intervention  -          Anticipated Dischage Disposition  home  -          Discharge Destination  home  -      User Key  (r) = Recorded By, (t) = Taken By, (c) = Cosigned By    Initials Name Effective Dates    Chelita Novoa, SLP 03/01/19 -              EDUCATION  The patient has been educated in the following areas:   Rationale for evaluation & evaluation findings.     SLP  Recommendation and Plan  SLP Diagnosis: Pt demonstrates speech, language, & cognitive linguistic skills which appear WFL on informal testing this date. No skilled ST appears warranted at this time. Please reconsult if services indicated     SLC Criteria for Skilled Therapy Interventions Met: no problems identified which require skilled intervention  Anticipated Dischage Disposition: home                           Chelita Carmichael, SLP  10/8/2019

## 2019-10-08 NOTE — CONSULTS
"Diabetes Education  Assessment/Teaching    Patient Name:  Herve Gill  YOB: 1965  MRN: [id number]  Admit Date:  10/7/2019      Assessment Date:  10/8/2019    Most Recent Value   General Information    Referral From:  MD order, A1c [Consult received due to stroke protocol and pt's A1c was 8.8% on 10/7/2019.]   Height  170.2 cm (67\")   Height Method  Stated   Weight  95.3 kg (210 lb)   Weight Method  Bed scale   Pregnancy Assessment   Diabetes History   What type of diabetes do you have?  Type 2   Length of Diabetes Diagnosis  Newly diagnosed <6 months   Current DM knowledge  poor   Have you had diabetes education/teaching in the past?  no   Do you test your blood sugar at home?  no   Have You Felt Down, Depressed or Hopeless?  no   Have You Felt Little Interest or Pleasure in Doing Things?  no   Education Preferences   What areas of diabetes would you like to learn about?  avoiding high blood sugar, avoiding low blood sugar, diabetes complications, exercise information, medications for diabetes, testing my blood sugar at home   Nutrition Information   Assessment Topics   Healthy Eating - Assessment  -- [Pt states RD has seen patient for meal planning education.]   Being Active - Assessment  Needs education   Taking Medication - Assessment  Needs education   Problem Solving - Assessment  Needs education   Reducing Risk - Assessment  Needs education   Monitoring - Assessment  Needs education   DM Goals   Taking Medication - Goal  Today   Problem Solving - Goal  Today   Reducing Risk - Goal  Today   Monitoring - Goal  Today            Most Recent Value   DM Education Needs   Meter  Needs meter   Meter Type  Accuchek [Gave pt the Accuchek Guide Me bs meter and instructed pt in use of meter. He states has been using a friend's meter to check bs some so he does not feel it is necessary to perform return demo. He veralized understanding of use of meter.]   Frequency of Testing  -- [Discussed importance " of checking bs at least 2-3 times/day and to rotate the times (ac and hs). Gave log book to record bs.]   Blood Glucose Target Range  Discussed pt's A1c result of 8.8%. Reviewd diagnostic criteria for DM. Pt states he had only been told he had prediabetes in the past. Discussed diabetes disease process. Reviewed healthy A1c target and healthy bs range.   Medication  Oral [Pt states had been started on metformin in the past for prediabetes but was not taking as prescribed. Pt is being d/cd on metformin 500 mg bid. Pt states will now take as prescribed.]   Problem Solving  Hypoglycemia, Hyperglycemia, Signs, Symptoms, Treatment   Reducing Risks  A1C testing   Physical Activity  -- [Discussed importance of exercise in helping control bs. Pt states is active. Discussed getting 150 min of activity in each week as long as MD approves.]   Motivation  Strong   Teaching Method  Explanation, Discussion, Demonstration   Patient Response  Verbalized understanding, Demonstrates adequately [Pt did insert test strip into meter correctly and loaded lancet drum into lancing device correctly. ]            Other Comments:  Discussed how meal plan and exercise are patient's main tx of DM and the metformin is a secondary tx. Discussed importance of follow up with MD. Pt states he will check bs 2-3 times/day and record bs and will take bs log to MD office. Gave A1c info sheet and First Steps booklet. Pt verbalized understanding of info covered. He has been d/cd and states will get rxs for test strips and lancets from PCP.         Electronically signed by:  Mayela Cummins RN  10/08/19 5:29 PM

## 2019-10-08 NOTE — THERAPY EVALUATION
Acute Care - Physical Therapy Initial Evaluation   Adriel     Patient Name: Herve Gill  : 1965  MRN: [id number]  Today's Date: 10/8/2019                Admit Date: 10/7/2019    Visit Dx:     ICD-10-CM ICD-9-CM   1. Dizziness R42 780.4     Patient Active Problem List   Diagnosis   • Dizziness     Past Medical History:   Diagnosis Date   • Diabetes mellitus (CMS/HCC)    • Heart disease      Past Surgical History:   Procedure Laterality Date   • INTERNAL CARDIAC DEFIBRILLATOR INSERTION Left         PT ASSESSMENT (last 12 hours)      Physical Therapy Evaluation     Row Name 10/08/19 171          PT Evaluation Time/Intention    Document Type  evaluation  -SS     Mode of Treatment  physical therapy  -SS     Row Name 10/08/19 1712          General Information    Prior Level of Function  independent:  -SS     Equipment Currently Used at Home  -- none  -SS     Pertinent History of Current Functional Problem  53 y/o M who presented with dizziness and was diagnosed with IC bleed- non surgical and not evolving. SBP <150  -     Row Name 10/08/19 1712          Relationship/Environment    Lives With  spouse  -     Row Name 10/08/19 1712          Resource/Environmental Concerns    Current Living Arrangements  home/apartment/condo  -     Row Name 10/08/19 1712          Cognitive Assessment/Intervention- PT/OT    Orientation Status (Cognition)  oriented x 4  -SS     Row Name 10/08/19 171          Bed Mobility Assessment/Treatment    Bed Mobility Assessment/Treatment  bed mobility (all) activities  -     [place] Level (Bed Mobility)  independent  -SS     Row Name 10/08/19 171          Transfer Assessment/Treatment    Transfer Assessment/Treatment  sit-stand transfer  -SS     Sit-Stand [place] (Transfers)  independent  -     Row Name 10/08/19 171          Gait/Stairs Assessment/Training    [place] Level (Gait)  independent  -     Assistive Device (Gait)  -- none  -SS     Distance in Feet  (Gait)  400  -SS     Comment (Gait/Stairs)  Pt scores 29/30 on FGA-- veers L with eyes closed. Gait and balance WNL-- no falls risk  -SS     Row Name 10/08/19 1712          General ROM    GENERAL ROM COMMENTS  WNL grossly  -SS     Row Name 10/08/19 1712          MMT (Manual Muscle Testing)    General MMT Comments  grossly 5/5  -     Row Name 10/08/19 1712          Motor Assessment/Intervention    Additional Documentation  -- dynamic standing balance-- good. SLS >30 sec B  -     Row Name 10/08/19 1712          Sensory Assessment/Intervention    Sensory General Assessment  no sensation deficits identified  -     Additional Documentation  -- proprioception, coordination WNL  -     Row Name 10/08/19 1712          Vision Assessment/Intervention    Vision Assessment Comment  WNL  -     Row Name 10/08/19 1712          Pain Scale: Numbers Pre/Post-Treatment    Pain Scale: Numbers, Pretreatment  0/10 - no pain  -     Pain Scale: Numbers, Post-Treatment  0/10 - no pain  -     Row Name 10/08/19 1712          Plan of Care Review    Plan of Care Reviewed With  patient  -     Row Name 10/08/19 1712          Physical Therapy Clinical Impression    Patient/Family Goals Statement (PT Clinical Impression)  Pt is at baseline for functional mobility. No balance, gait, coordination, sensation or vision deficits. Pt is safe to return home. educated pt about BP restrictions of SBP<150 and how this will be important to monitor with activity. P{t verbalizes understanding.   -     Criteria for Skilled Interventions Met (PT Clinical Impression)  no  -SS       User Key  (r) = Recorded By, (t) = Taken By, (c) = Cosigned By    Initials Name Provider Type    Koki Marcelino, PT Physical Therapist        Physical Therapy Education     Title: PT OT SLP Therapies (Resolved)     Topic: Physical Therapy (Resolved)     Point: Mobility training (Resolved)     Learning Progress Summary           Patient Acceptance, E, VU by SS at  10/8/2019  5:17 PM                               User Key     Initials Effective Dates Name Provider Type [facility] 06/19/19 -  Koki Minaya PT Physical Therapist PT              PT Recommendation and Plan  Anticipated Discharge Disposition (PT): home  Therapy Frequency (PT Clinical Impression): evaluation only  Outcome Summary/Treatment Plan (PT)  Anticipated Discharge Disposition (PT): home  Plan of Care Reviewed With: patient  Outcome Measures     Row Name 10/08/19 1718             Modified Abdirashid Scale    Pre-Stroke Modified Abdirashid Scale  0 - No Symptoms at all.  -SS      Modified [place] Scale  0 - No Symptoms at all.  -SS         Functional Assessment    Outcome Measure Options  Modified Abdirashid  -SS        User Key  (r) = Recorded By, (t) = Taken By, (c) = Cosigned By    Initials Name Provider Type     Koki Minaya PT Physical Therapist         Time Calculation:   PT Charges     Row Name 10/08/19 1718             Time Calculation    Start Time  1633  -      Stop Time  1647  -      Time Calculation (min)  14 min  -      PT Received On  10/08/19  -         Time Calculation- PT    Total Timed Code Minutes- PT  0 minute(s)  -        User Key  (r) = Recorded By, (t) = Taken By, (c) = Cosigned By    Initials Name Provider Type     Koki Minaya, PT Physical Therapist        Therapy Charges for Today     Code Description Service Date Service Provider Modifiers Qty    [id number] HC PT EVAL LOW COMPLEXITY 3 10/8/2019 Koki Minaya, PT GP 1          PT G-Codes  Outcome Measure Options: Modified [place]  Modified [place] Scale: 0 - No Symptoms at all.      Koki Minaya PT  10/8/2019

## 2019-10-08 NOTE — PLAN OF CARE
Problem: Patient Care Overview  Goal: Plan of Care Review   10/08/19 1549   Coping/Psychosocial   Plan of Care Reviewed With patient;spouse   OTHER   Outcome Summary Completed speech/language/cognitive linguistic assessment via informal measures this date in the setting of [facility]. Pt demonstrates speech, language, & cognitive linguistic skills which appear WFL on informal testing this date. No skilled ST appears warranted at this time. Please reconsult if services indicated

## 2019-10-08 NOTE — PROGRESS NOTES
Discharge Planning Assessment   Adriel     Patient Name: Herve Gill  MRN: [id number]  Today's Date: 10/8/2019    Admit Date: 10/7/2019    Discharge Needs Assessment     Row Name 10/08/19 1042       Living Environment    Lives With  spouse    Current Living Arrangements  home/apartment/condo    Primary Care Provided by  self    Provides Primary Care For  no one    Family Caregiver if Needed  spouse    Able to Return to Prior Arrangements  yes       Resource/Environmental Concerns    Transportation Concerns  car, none       Transition Planning    Patient/Family Anticipates Transition to  home with family    Transportation Anticipated  family or friend will provide       Discharge Needs Assessment    Readmission Within the Last 30 Days  no previous admission in last 30 days    Concerns to be Addressed  no discharge needs identified    Equipment Currently Used at Home  none    Equipment Needed After Discharge  none    Offered/Gave Vendor List  no        Discharge Plan     Row Name 10/08/19 1041       Plan    Plan  Return home    Plan Comments  Return home            Demographic Summary     Row Name 10/08/19 1041       General Information    Admission Type  observation    Arrived From  emergency department    Required Notices Provided  Observation Status Notice    Referral Source  admission list    Preferred Language  English        Functional Status     Row Name 10/08/19 1042       Functional Status    Usual Activity Tolerance  good    Current Activity Tolerance  good       Functional Status, IADL    Medications  independent       Mental Status    General Appearance WDL  WDL            Davida Mcleod RN   [phone]

## 2019-10-08 NOTE — CONSULTS
Primary Care Provider: Provider, No Known     Consult requested by: Julieta Noland PA    Reason for Consultation: Neurological evaluation, stroke     History taken from: patient chart RN    Chief complaint: Dizziness       SUBJECTIVE:    History of present illness:  Herve Gill is a 54 y.o. male who presented to the ED yesterday with a two day history of dizziness and nausea.  Patient states that the W experience a spinning sensation in the room and felt like he was going to follow over.  This was always associated with nausea but no vomiting.  Patient has a family history of vertigo he initially thought that this was just related to vertigo.  He also had high blood sugar yesterday morning and had thought that the dizziness could be related to that.  However did not resolve over the course of 2 days.  Patient denies any syncopal episodes, headache., blurry vision, double vision, sore throat, chest pain, abd pain.      Patient does have a medical history is significant for a implanted defibrillator that is not MRI compatible.  He had this placed approximately 9 years ago and has an ejection fracture of 28%.  Prior to placement of the defibrillator the patient underwent a right hip replacement.  Patient also has uncontrolled diabetes and states his blood glucose was 287 yesterday.  He has a long family history of vertigo.        Portions of the above HPI have been copied from previous encounters and edited as appropriate.    Patient states he had acute onset of vertigo on Sunday night.  He was able to walk and every episode lasted for only 10 seconds.  That was the initial first severe episode, he had multiple other episodes but more mild.  He went to his primary care provider because he felt that he was having some dizziness.  At one point he almost blacked out while he was on the couch.  He checked his blood sugar at home and it was greater than 300.  He had some nausea with no associated vomiting, no  headache, no focal deficit, no trouble with speech, no coronation problems, no visual changes.    Review of Systems   Constitutional: Negative.    Cardiovascular: Negative.    Neurological: Positive for dizziness and light-headedness. Negative for tremors, seizures, syncope, facial asymmetry, speech difficulty, weakness, numbness and headaches.          PATIENT HISTORY:  Past Medical History:   Diagnosis Date   • Diabetes mellitus (CMS/HCC)    • Heart disease    , Past Surgical History:   Procedure Laterality Date   • INTERNAL CARDIAC DEFIBRILLATOR INSERTION Left    , History reviewed. No pertinent family history., Social History     Tobacco Use   • Smoking status: Never Smoker   • Smokeless tobacco: Never Used   Substance Use Topics   • Alcohol use: Yes     Comment: occasional    • Drug use: No   , Medications Prior to Admission   Medication Sig Dispense Refill Last Dose   • aspirin 81 MG EC tablet Take 81 mg by mouth Daily.   10/6/2019 at Unknown time   • atorvastatin (LIPITOR) 40 MG tablet Take 40 mg by mouth Daily.   10/6/2019 at Unknown time   • carvedilol (COREG) 25 MG tablet Take 25 mg by mouth 2 (Two) Times a Day With Meals.   10/6/2019 at Unknown time   • clopidogrel (PLAVIX) 75 MG tablet Take 75 mg by mouth Daily.   10/6/2019 at Unknown time   • enalapril (VASOTEC) 10 MG tablet Take 10 mg by mouth 2 (Two) Times a Day.   10/6/2019 at Unknown time   • metFORMIN (GLUCOPHAGE) 500 MG tablet Take 500 mg by mouth 2 (Two) Times a Day With Meals.   10/7/2019 at Unknown time   , Scheduled Meds:    atorvastatin 40 mg Oral Daily   carvedilol 25 mg Oral BID With Meals   enalapril 10 mg Oral BID   sodium chloride 10 mL Intravenous Q12H   sodium chloride 10 mL Intravenous Q12H   , Continuous Infusions:   , PRN Meds:  •  acetaminophen  •  enalaprilat  •  [COMPLETED] Insert peripheral IV **AND** sodium chloride  •  sodium chloride  •  sodium chloride, Allergies:  Patient has no known  allergies.    ________________________________________________________        OBJECTIVE:    PHYSICAL EXAM:    Constitutional: The patient is in no apparent distress, bright awake and alert. There is no shortness of breath.     Normocephalic, atraumatic. TMJ open symmetrically without tenderness.    Chest: Breathing unlabored    Cardiac: Regular rate and rhythm.     Extremities:  No clubbing, cyanosis or edema.    NEUROLOGICAL:    Cognition:   Fully oriented.  Fund of knowledge excellent.  Concentration and attention normal.   Language normal with normal comprehension, fluent speech, intact repetition and naming.   Short and long term memory appears intact    Cranial nerves;    II - pupils bilaterally equal reacting to light,  No new Visual field deficits;  Fundoscopic exam- Not able to be done, non-dilated exam  III,IV,VI: EOMI with no diplopia  V: Normal facial sensations  VII: No facial asymmetry,  VIII: No New hearing abnormality  IX, X, XI: normal gag and shoulder shrug;  XII: tongue is in the midline.    Sensory:  Intact to light touch in all extremities.     Motor: Strength 5/5 bilaterally upper and lower extremities. No involuntary movements present. Normal tone and bulk.  Deep tendon reflexes: 2/4 and symmetrical in biceps, brachioradialis, triceps, bilateral 1/4 knees and ankles. Both plantars are flexor.    Cerebellar: Finger to nose and mirror movements normal bilaterally.    Gait and balance: Deferred.     Physical exam performed by LAMONT Noriega.     ________________________________________________________   RESULTS REVIEW:    VITAL SIGNS:   Temp:  [98.4 °F (36.9 °C)-98.6 °F (37 °C)] 98.4 °F (36.9 °C)  Heart Rate:  [60-77] 73  Resp:  [14-18] 16  BP: ()/(54-77) 94/54     LABS:  WBC   Date Value Ref Range Status   10/08/2019 5.30 3.40 - 10.80 10*3/mm3 Final     RBC   Date Value Ref Range Status   10/08/2019 4.35 4.14 - 5.80 10*6/mm3 Final     Hemoglobin   Date Value Ref Range Status    10/08/2019 13.9 13.0 - 17.7 g/dL Final     Hematocrit   Date Value Ref Range Status   10/08/2019 39.2 37.5 - 51.0 % Final     MCV   Date Value Ref Range Status   10/08/2019 90.0 79.0 - 97.0 fL Final     MCH   Date Value Ref Range Status   10/08/2019 32.0 26.6 - 33.0 pg Final     MCHC   Date Value Ref Range Status   10/08/2019 35.5 31.5 - 35.7 g/dL Final     RDW   Date Value Ref Range Status   10/08/2019 13.9 12.3 - 15.4 % Final     RDW-SD   Date Value Ref Range Status   10/08/2019 44.2 37.0 - 54.0 fl Final     MPV   Date Value Ref Range Status   10/08/2019 9.9 6.0 - 12.0 fL Final     Platelets   Date Value Ref Range Status   10/08/2019 127 (L) 140 - 450 10*3/mm3 Final     Neutrophil %   Date Value Ref Range Status   10/08/2019 54.5 42.7 - 76.0 % Final     Lymphocyte %   Date Value Ref Range Status   10/08/2019 35.0 19.6 - 45.3 % Final     Monocyte %   Date Value Ref Range Status   10/08/2019 8.0 5.0 - 12.0 % Final     Eosinophil %   Date Value Ref Range Status   10/08/2019 1.9 0.3 - 6.2 % Final     Basophil %   Date Value Ref Range Status   10/08/2019 0.6 0.0 - 1.5 % Final     Neutrophils, Absolute   Date Value Ref Range Status   10/08/2019 2.90 1.70 - 7.00 10*3/mm3 Final     Lymphocytes, Absolute   Date Value Ref Range Status   10/08/2019 1.80 0.70 - 3.10 10*3/mm3 Final     Monocytes, Absolute   Date Value Ref Range Status   10/08/2019 0.40 0.10 - 0.90 10*3/mm3 Final     Eosinophils, Absolute   Date Value Ref Range Status   10/08/2019 0.10 0.00 - 0.40 10*3/mm3 Final     Basophils, Absolute   Date Value Ref Range Status   10/08/2019 0.00 0.00 - 0.20 10*3/mm3 Final     nRBC   Date Value Ref Range Status   10/08/2019 0.1 0.0 - 0.2 /100 WBC Final     Glucose   Date Value Ref Range Status   10/08/2019 166 (H) 65 - 99 mg/dL Final     BUN   Date Value Ref Range Status   10/08/2019 13 8 - 20 mg/dL Final     Creatinine   Date Value Ref Range Status   10/08/2019 0.90 0.70 - 1.20 mg/dL Final     Sodium   Date Value Ref  Range Status   10/08/2019 136 136 - 144 mmol/L Final     Potassium   Date Value Ref Range Status   10/08/2019 3.9 3.6 - 5.1 mmol/L Final     Chloride   Date Value Ref Range Status   10/08/2019 99 (L) 101 - 111 mmol/L Final     CO2   Date Value Ref Range Status   10/08/2019 25.0 22.0 - 32.0 mmol/L Final     Calcium   Date Value Ref Range Status   10/08/2019 8.6 (L) 8.9 - 10.3 mg/dL Final     Total Protein   Date Value Ref Range Status   10/08/2019 6.0 (L) 6.1 - 7.9 g/dL Final     Albumin   Date Value Ref Range Status   10/08/2019 3.70 3.50 - 4.80 g/dL Final     ALT (SGPT)   Date Value Ref Range Status   10/08/2019 67 (H) 17 - 63 U/L Final     AST (SGOT)   Date Value Ref Range Status   10/08/2019 51 (H) 15 - 41 U/L Final     Alkaline Phosphatase   Date Value Ref Range Status   10/08/2019 54 32 - 91 U/L Final     Total Bilirubin   Date Value Ref Range Status   10/08/2019 0.6 0.3 - 1.2 mg/dL Final     eGFR Non  Amer   Date Value Ref Range Status   10/08/2019 88 >60 mL/min/1.73 Final     BUN/Creatinine Ratio   Date Value Ref Range Status   10/08/2019 14.4 6.2 - 20.3 Final     Anion Gap   Date Value Ref Range Status   10/08/2019 15.9 (H) 5.0 - 15.0 mmol/L Final       Lab Results   Component Value Date    TSH 3.370 10/08/2019     (H) 10/08/2019    HGBA1C 8.8 (H) 10/07/2019    [id number] 204 10/08/2019         IMAGING STUDIES:  Ct Angiogram Head With & Without Contrast    Result Date: 10/7/2019  Basilar tip aneurysm measuring 5.5 mm.  Electronically Signed By-Kory Hayden On:10/7/2019 4:01 PM This report was finalized on [id number] by  Kory Hayden, .    Ct Head Without Contrast    Result Date: 10/7/2019   1. 6 mm hyperdense lesion in the posterior aspect of the right centrum semiovale. Diagnostic considerations are discussed above. 2. I would recommend an MRI of the brain with and without contrast for follow-up.  Electronically Signed By-Hasmukh Streeter On:10/7/2019 11:01 AM This report was finalized on  [id number] by  Hasmukh Streeter, .    Ct Angiogram Neck With & Without Contrast    Result Date: 10/7/2019  Basilar tip aneurysm measuring 5.5 mm.  Electronically Signed By-Kory Hayden On:10/7/2019 4:01 PM This report was finalized on [id number] by  Kory Hayden, .      I reviewed the patient's new clinical results.      ________________________________________________________     PROBLEM LIST:    Dizziness          Assessment/Plan   ASSESSMENT/PLAN:     1. Small ICH within the right centrum semioval.  - CT head: 6 mm hyperdense lesion in the posterior aspect of the right centrum semioval.   - CTA head and neck: Basilar tip aneurysm measuring 5.5 mm.  - Pacer not MRI compatible.   - Repeat CT head today reviewed without change in ICH. No ischemic changes noted.   - Echo: Pending, can follow up outpatient with results.   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  Hyperlipidemia  -Statin and healthy heart diet    4.  Vitamin B12 deficiency  - Replacement ordered  - Recommend monthly B12 injections outpt or 1000 mcg p.o. daily    5.  Type 2 diabetes mellitus, uncontrolled  -Strict glycemic control, sliding scale insulin  -Diabetes educator    6. Modification of stroke risk factors:   - Blood pressure should be less than 130/80 outpatient, HbA1c less than 6.5, LDL less than 70; b12>500 and smoking cessation if applicable. We would be grateful if the primary team / primary care physician keep a close watch on this above targets.  - Stroke education  - Follow up with PCP.     Will sign off, please call with questions or concerns. I discussed the patients findings and my recommendations with patient, family and nursing staff    Cee Moreira  KJ Hollingsworth  10/08/19  10:11 AM

## 2019-10-08 NOTE — DISCHARGE SUMMARY
Date of Admission: 10/7/2019    Date of Discharge:  10/8/2019    Length of stay:  LOS: 0 days     Presenting Problem/History of Present Illness  Active Hospital Problems    Diagnosis  POA   • Dizziness [R42]  Yes      Resolved Hospital Problems   No resolved problems to display.          Hospital Course    Chief Complaint   Patient presents with   • Dizziness       Patient presented with dizziness without ear symptoms.  Work-up showed evidence of intracranial hemorrhage.  Assessment/plan    1. Small ICH within the right centrum semioval.  - CT head: 6 mm hyperdense lesion in the posterior aspect of the right centrum semioval.   - CTA head and neck: Basilar tip aneurysm measuring 5.5 mm.  - Pacer not MRI compatible.   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  Now blood pressure lower 90s systolic.  Will decrease enalapril to 10 mg once daily.  Patient advised to follow-up pressure closely with parameters to keep between 100 and 140.  Systolic    3.  Hyperlipidemia  -Statin and healthy heart diet     4.  Vitamin B12 deficiency  - Replacement ordered  - Recommend monthly B12 injections outpt or 1000 mcg p.o. daily     5.  Type 2 diabetes mellitus, uncontrolled  -Strict glycemic control, sliding scale insulin  -Diabetes educator     6. Modification of stroke risk factors:   - Blood pressure should be less than 130/80 outpatient, HbA1c less than 6.5, LDL less than 70; b12>500 and smoking cessation if applicable. We would be grateful if the primary team / primary care physician keep a close watch on this above targets.  - Stroke education  - Follow up with PCP.          Past Medical History:     Past Medical History:   Diagnosis Date   • Diabetes mellitus (CMS/HCC)    • Heart disease        Past Surgical History:     Past Surgical History:   Procedure Laterality Date   • INTERNAL CARDIAC DEFIBRILLATOR INSERTION Left        Social History:   Social History     Socioeconomic History   • Marital status:      Spouse name: Not on file   • Number of children: Not on file   • Years of education: Not on file   • Highest education level: Not on file   Tobacco Use   • Smoking status: Never Smoker   • Smokeless tobacco: Never Used   Substance and Sexual Activity   • Alcohol use: Yes     Comment: occasional    • Drug use: No   • Sexual activity: Defer       Procedures Performed         Vital Signs  Temp:  [97.9 °F (36.6 °C)-98.6 °F (37 °C)] 97.9 °F (36.6 °C)  Heart Rate:  [60-73] 68  Resp:  [14-17] 14  BP: ()/(54-77) 92/66    Physical Exam:  Physical Exam   Constitutional: He is oriented to person, place, and time. He appears well-developed and well-nourished. No distress.   HENT:   Head: Normocephalic and atraumatic.   Right Ear: External ear normal.   Left Ear: External ear normal.   Nose: Nose normal.   Mouth/Throat: Oropharynx is clear and moist. No oropharyngeal exudate.   Eyes: Conjunctivae and EOM are normal. Pupils are equal, round, and reactive to light. Right eye exhibits no discharge. Left eye exhibits no discharge. No scleral icterus.   Neck: Normal range of motion. No JVD present. No tracheal deviation present. No thyromegaly present.   Cardiovascular: Normal rate, regular rhythm, normal heart sounds and intact distal pulses. Exam reveals no gallop and no friction rub.   No murmur heard.  Pulmonary/Chest: Effort normal and breath sounds normal. No stridor. No respiratory distress. He has no wheezes. He has no rales. He exhibits no tenderness.   Abdominal: Soft. Bowel sounds are normal. He exhibits no distension and no mass. There is no tenderness. There is no  rebound and no guarding. No hernia.   Musculoskeletal: Normal range of motion. He exhibits no edema, tenderness or deformity.   Lymphadenopathy:     He has no cervical adenopathy.   Neurological: He is alert and oriented to person, place, and time. No cranial nerve deficit or sensory deficit. He exhibits normal muscle tone. Coordination normal.   Skin: Skin is warm and dry. No rash noted. He is not diaphoretic. No erythema.   Psychiatric: He has a normal mood and affect. His behavior is normal.   Nursing note and vitals reviewed.      Discharge Diagnosis:     Dizziness      Present on Admission:  • Dizziness      Discharge Disposition  Home or Self Care       Discharge Medications      New Medications      Instructions Start Date   cyanocobalamin 1000 MCG tablet  Commonly known as:  VITAMIN B-12   1,000 mcg, Oral, Daily   Start Date:  10/9/2019        Changes to Medications      Instructions Start Date   enalapril 10 MG tablet  Commonly known as:  VASOTEC  What changed:    · when to take this  · additional instructions   10 mg, Oral, Daily, Hold if bp below 110/60         Continue These Medications      Instructions Start Date   atorvastatin 40 MG tablet  Commonly known as:  LIPITOR   40 mg, Oral, Daily      carvedilol 25 MG tablet  Commonly known as:  COREG   25 mg, Oral, 2 Times Daily With Meals      metFORMIN 500 MG tablet  Commonly known as:  GLUCOPHAGE   500 mg, Oral, 2 Times Daily With Meals         Stop These Medications    aspirin 81 MG EC tablet     clopidogrel 75 MG tablet  Commonly known as:  PLAVIX                Consults:   Consults     Date and Time Order Name Status Description    10/7/2019 1633 Inpatient Neurology Consult Stroke      10/7/2019 1550 Inpatient Neurosurgery Consult Completed     10/7/2019 1404 Hospitalist (on-call MD unless specified) Completed     10/7/2019 1351 Inpatient Neurology Consult General Completed           Pertinent Test Results:     I reviewed the patient's new clinical  results.    Results from last 7 days   Lab Units 10/08/19  0502 10/07/19  1022   WBC 10*3/mm3 5.30 4.70   HEMOGLOBIN g/dL 13.9 15.3   HEMATOCRIT % 39.2 44.3   PLATELETS 10*3/mm3 127* 134*     Results from last 7 days   Lab Units 10/08/19  0502 10/07/19  1022   SODIUM mmol/L 136 134*   POTASSIUM mmol/L 3.9 4.4   CHLORIDE mmol/L 99* 100*   CO2 mmol/L 25.0 23.0   BUN mg/dL 13 12   CREATININE mg/dL 0.90 0.90   GLUCOSE mg/dL 166* 302*   CALCIUM mg/dL 8.6* 9.0     Results from last 7 days   Lab Units 10/08/19  0502 10/07/19  1022   SODIUM mmol/L 136 134*   POTASSIUM mmol/L 3.9 4.4   CHLORIDE mmol/L 99* 100*   CO2 mmol/L 25.0 23.0   BUN mg/dL 13 12   CREATININE mg/dL 0.90 0.90   CALCIUM mg/dL 8.6* 9.0   BILIRUBIN mg/dL 0.6 0.5   ALK PHOS U/L 54 60   ALT (SGPT) U/L 67* 68*   AST (SGOT) U/L 51* 44*   GLUCOSE mg/dL 166* 302*         Lab Results   Component Value Date    CALCIUM 8.6 (L) 10/08/2019     Hemoglobin A1C   Date Value Ref Range Status   10/07/2019 8.8 (H) 3.5 - 5.6 % Final             Microbiology Results (last 10 days)     ** No results found for the last 240 hours. **          ECG/EMG Results (most recent)     Procedure Component Value Units Date/Time    ECG 12 Lead [[id number]] Collected:  10/07/19 1023     Updated:  10/08/19 0707    Narrative:       HEART RATE= 70  bpm  RR Interval= 856  ms  VT Interval= 147  ms  P Horizontal Axis= 12  deg  P Front Axis= 35  deg  QRSD Interval= 108  ms  QT Interval= 406  ms  QRS Axis= 35  deg  T Wave Axis= -26  deg  - ABNORMAL ECG -  Sinus rhythm  Inferior infarct, old  When compared with ECG of 26-Sep-2017 18:05:42,  Significant rate decrease  Electronically Signed By: Von Henderson (NIKKI) 08-Oct-2019 07:06:49  Date and Time of Study: 2019-10-07 10:23:48    Adult Transthoracic Echo Complete W/ Cont if Necessary Per Protocol [[id number]] Resulted:  10/08/19 1224     Updated:  10/08/19 1224     Target HR (85%) 142 bpm      Max. Pred. HR (100%) 167 bpm                     Ct Angiogram  Head With & Without Contrast    Result Date: 10/7/2019  Basilar tip aneurysm measuring 5.5 mm.  Electronically Signed By-Kory Hayden On:10/7/2019 4:01 PM This report was finalized on [id number] by  Kory Hayden, .    Ct Head Without Contrast    Result Date: 10/8/2019  1.6 mm hyperdensity within right centrum semiovale is unchanged from prior exam. Differential considerations are unchanged. Recommend either continued follow-up with CT or evaluation with MRI with/without IV contrast. 2.No new acute intracranial process identified. 3.Mild mucosal disease within right maxillary sinus.  Electronically Signed By-DR. Rafi Billy MD On:10/8/2019 1:11 PM This report was finalized on [id number] by DR. Rafi Billy MD.    Ct Head Without Contrast    Result Date: 10/7/2019   1. 6 mm hyperdense lesion in the posterior aspect of the right centrum semiovale. Diagnostic considerations are discussed above. 2. I would recommend an MRI of the brain with and without contrast for follow-up.  Electronically Signed By-Hasmukh Streeter On:10/7/2019 11:01 AM This report was finalized on [id number] by  Hasmukh Streeter, .    Ct Angiogram Neck With & Without Contrast    Result Date: 10/7/2019  Basilar tip aneurysm measuring 5.5 mm.  Electronically Signed By-Kory Hayden On:10/7/2019 4:01 PM This report was finalized on [id number] by  Kory Hayden, .        Condition on Discharge:    Stable    Discharge Diet:   Diabetic heart healthy diet  Activity at Discharge:     Follow-up Appointments  No future appointments.  Follow-up with neurologist 1 month  Follow with neurosurgery 1 month      Test Results Pending at Discharge  Physical therapy evaluation  Echo  Risk for Readmission (LACE) Score: 1 (10/8/2019  6:00 AM)          Víctor Nascimento MD  10/08/19  2:07 PM    Time: Greater than 30 minutes in discharge planning

## 2019-10-08 NOTE — PLAN OF CARE
Problem: Patient Care Overview  Goal: Individualization and Mutuality  Outcome: Ongoing (interventions implemented as appropriate)    Goal: Discharge Needs Assessment  Outcome: Ongoing (interventions implemented as appropriate)    Goal: Interprofessional Rounds/Family Conf  Outcome: Ongoing (interventions implemented as appropriate)   10/08/19 1216   Interdisciplinary Rounds/Family Conf   Participants ;nursing;pharmacy;social work/services;speech language pathology       Problem: Stroke (Hemorrhagic) (Adult)  Goal: Signs and Symptoms of Listed Potential Problems Will be Absent, Minimized or Managed (Stroke)  Outcome: Ongoing (interventions implemented as appropriate)   10/08/19 1216   Goal/Outcome Evaluation   Problems Assessed (Stroke (Hemorrhagic)) acute neurologic deterioration

## 2019-10-08 NOTE — PROGRESS NOTES
Discharge Planning Assessment  [facility]     Patient Name: Herve Gill  MRN: [id number]  Today's Date: 10/8/2019    Admit Date: 10/7/2019    Discharge Needs Assessment     Row Name 10/08/19 1212       Living Environment    Lives With  spouse    Current Living Arrangements  home/apartment/condo    Primary Care Provided by  self    Provides Primary Care For  no one    Family Caregiver if Needed  none       Resource/Environmental Concerns    Transportation Concerns  car, none       Transition Planning    Patient/Family Anticipates Transition to  home with family    Patient/Family Anticipated Services at Transition  none       Discharge Needs Assessment    Readmission Within the Last 30 Days  no previous admission in last 30 days    Concerns to be Addressed  no discharge needs identified    Equipment Currently Used at Home  none    Equipment Needed After Discharge  none    Row Name 10/08/19 1042       Living Environment    Lives With  spouse    Current Living Arrangements  home/apartment/condo    Primary Care Provided by  self    Provides Primary Care For  no one    Family Caregiver if Needed  spouse    Able to Return to Prior Arrangements  yes       Resource/Environmental Concerns    Transportation Concerns  car, none       Transition Planning    Patient/Family Anticipates Transition to  home with family    Transportation Anticipated  family or friend will provide       Discharge Needs Assessment    Readmission Within the Last 30 Days  no previous admission in last 30 days    Concerns to be Addressed  no discharge needs identified    Equipment Currently Used at Home  none    Equipment Needed After Discharge  none    Offered/Gave Vendor List  no        Discharge Plan     Row Name 10/08/19 1209       Plan    Plan  Return home with spouse    Plan Comments  Return home with spouse    Row Name 10/08/19 1041       Plan    Plan  Return home    Plan Comments  Return home          Expected Discharge Date and Time      Expected Discharge Date Expected Discharge Time    Oct 8, 2019         Demographic Summary     Row Name 10/08/19 1210       General Information    Admission Type  observation    Arrived From  emergency department    Required Notices Provided  Observation Status Notice    Referral Source  admission list    Preferred Language  English    Row Name 10/08/19 1041       General Information    Admission Type  observation    Arrived From  emergency department    Required Notices Provided  Observation Status Notice    Referral Source  admission list    Preferred Language  English        Functional Status     Row Name 10/08/19 1212       Functional Status    Usual Activity Tolerance  good    Current Activity Tolerance  good       Functional Status, IADL    Medications  independent       Mental Status    General Appearance WDL  WDL    Row Name 10/08/19 1042       Functional Status    Usual Activity Tolerance  good    Current Activity Tolerance  good       Functional Status, IADL    Medications  independent       Mental Status    General Appearance WDL  WDL   Spoke with patient and spouse at bedside, plan to return home upon discharge.  Await CT results      Davida Mcleod RN   [phone]

## 2019-10-08 NOTE — CONSULTS
Referring Provider: Hospitalist  Reason for Consultation: Abnormal CT head    Patient Care Team:  Rogerio Gordillo MD as PCP - General    Chief complaint- Dizziness    Subjective .     History of present illness:  Herve Gill is a 54 y.o. male who presented to the ED yesterday with a two day history of dizziness and nausea.  Patient states that the W experience a spinning sensation in the room and felt like he was going to follow over.  This was always associated with nausea but no vomiting.  Patient has a family history of vertigo he initially thought that this was just related to vertigo.  He also had high blood sugar yesterday morning and had thought that the dizziness could be related to that.  However did not resolve over the course of 2 days.  Patient denies any syncopal episodes, headache., blurry vision, double vision, sore throat, chest pain, abd pain.     Patient does have a medical history is significant for a implanted defibrillator that is not MRI compatible.  He had this placed approximately 9 years ago and has an ejection fracture of 28%.  Prior to placement of the defibrillator the patient underwent a right hip replacement.  Patient also has uncontrolled diabetes and states his blood glucose was 287 yesterday.  He has a long family history of vertigo.    Patient denies any personal history of vasculitis, autoimmune disorders, cancer hypertension.      Review of Systems  Review of Systems   Constitutional: Negative for activity change, appetite change, chills, diaphoresis, fatigue, fever and unexpected weight change.   HENT: Negative for sinus pressure, sore throat and trouble swallowing.    Eyes: Negative for photophobia, pain and visual disturbance.   Respiratory: Negative for chest tightness and shortness of breath.    Gastrointestinal: Positive for nausea. Negative for abdominal distention, abdominal pain, diarrhea and vomiting.   Musculoskeletal: Negative for back pain, gait problem,  myalgias, neck pain and neck stiffness.   Neurological: Positive for dizziness and light-headedness. Negative for syncope, speech difficulty, weakness, numbness and headaches.   Psychiatric/Behavioral: Negative for sleep disturbance. The patient is not nervous/anxious.    All other systems reviewed and are negative.      History  Past Medical History:   Diagnosis Date   • Diabetes mellitus (CMS/HCC)    • Heart disease      Past Surgical History:   Procedure Laterality Date   • INTERNAL CARDIAC DEFIBRILLATOR INSERTION Left      History reviewed. No pertinent family history.  Social History     Tobacco Use   • Smoking status: Never Smoker   • Smokeless tobacco: Never Used   Substance Use Topics   • Alcohol use: Yes     Comment: occasional    • Drug use: No     Medications Prior to Admission   Medication Sig Dispense Refill Last Dose   • aspirin 81 MG EC tablet Take 81 mg by mouth Daily.   10/6/2019 at Unknown time   • atorvastatin (LIPITOR) 40 MG tablet Take 40 mg by mouth Daily.   10/6/2019 at Unknown time   • carvedilol (COREG) 25 MG tablet Take 25 mg by mouth 2 (Two) Times a Day With Meals.   10/6/2019 at Unknown time   • clopidogrel (PLAVIX) 75 MG tablet Take 75 mg by mouth Daily.   10/6/2019 at Unknown time   • enalapril (VASOTEC) 10 MG tablet Take 10 mg by mouth 2 (Two) Times a Day.   10/6/2019 at Unknown time   • metFORMIN (GLUCOPHAGE) 500 MG tablet Take 500 mg by mouth 2 (Two) Times a Day With Meals.   10/7/2019 at Unknown time     Patient has no known allergies.    Scheduled Meds:    atorvastatin 40 mg Oral Daily   carvedilol 25 mg Oral BID With Meals   enalapril 10 mg Oral BID   sodium chloride 10 mL Intravenous Q12H   sodium chloride 10 mL Intravenous Q12H     Continuous Infusions:   PRN Meds:.•  acetaminophen  •  enalaprilat  •  [COMPLETED] Insert peripheral IV **AND** sodium chloride  •  sodium chloride  •  sodium chloride    Objective     Vital Signs   Vitals:    10/08/19 0500 10/08/19 0557 10/08/19 0600  10/08/19 0700   BP:  94/54     BP Location:  Left arm     Patient Position:  Lying     Pulse: 60 62 73 62   Resp:  16     Temp:  98.4 °F (36.9 °C)     TempSrc:  Oral     SpO2: 94% 92% 94% 94%   Weight:       Height:           Physical Exam:     General Appearance:    Alert, cooperative, in no acute distress   Head:    Normocephalic, without obvious abnormality, atraumatic   Eyes:            Lids and lashes normal, PERRLA   Ears:    Ears appear intact with no abnormalities noted   Neck:   No adenopathy, supple, trachea midline   Back:     Range of motion normal   Lungs:     Respirations regular and unlabored   Chest Wall:    No abnormalities observed   Abdomen:     No masses, soft, non-tender, non-distended, no guarding   Extremities:   Moves all extremities well, no edema, no cyanosis, no             redness   Pulses:   Pulses palpable and equal bilaterally   Skin:   No bleeding, bruising or rash   Neurologic:   sensation intact, DTR, present and equal bilaterally        Neurological examination:  Higher Integrative  Function: Oriented to time, place and person. Normal registration, recall, attention span and concentration.  CN II: Pupils are equal, round, and reactive to light. Normal visual acuity and visual fields.    CN III IV VI: Extraocular movements are full.  CN V: Normal facial sensation and strength of muscles of mastication.  CN VII: Facial movements are symmetric. No weakness.  CN VIII:   Auditory acuity is normal.  CN IX & X:   Symmetric palatal movement.  CN XI: Sternocleidomastoid and trapezius are normal.  No weakness.  CN XII:   The tongue is midline.  No atrophy or fasciculations.  Motor: Normal muscle strength, bulk and tone in upper and lower extremities.  Sensation: Normal to light touch, pinprick, vibration, temperature, and proprioception in arms and legs. Normal graphesthesia and no extinction on DSS.  Coordination: Finger to nose test shows no dysmetria.        Results Review:  Lab Results  (last 24 hours)     Procedure Component Value Units Date/Time    POC Glucose Once [[id number]]  (Abnormal) Collected:  10/08/19 0748    Specimen:  Blood Updated:  10/08/19 0749     Glucose 192 mg/dL      Comment: Serial Number: [id number]:  [id number]       Lipid Panel [[id number]]  (Abnormal) Collected:  10/08/19 0502    Specimen:  Blood Updated:  10/08/19 0628     Total Cholesterol 349 mg/dL      Triglycerides 983 mg/dL      HDL Cholesterol 32 mg/dL      LDL Cholesterol  164 mg/dL      VLDL Cholesterol 196.6 mg/dL      LDL/HDL Ratio 3.76     Chol/HDL Ratio 10.91    Narrative:       The following guidelines have been recommended by the NCEP for Total Cholesterol, Total Triglycerides, LDL Cholesterol, and HDL Cholesterols    Total Cholesterol  Desirable:        <200 mg/dL  Borderline High:  200-239 mg/dL  High:             > or = 240 mg/dL    Total Triglyceride  Normal:           <150 mg/dL  Borderline High:  150-199 mg/dL  High:             200-499 mg/dL  Very High:        > or = 500 mg/dL    HDL Cholesterol  Low HDL:          <40 mg/dL  Normal:           40-60 mg/dL  Desirable:        >60 mg/dL    LDL Cholesterol  Optimal:          <100 mg/dL  Low Risk:         100-129 mg/dL  Borderline High:  130-159 mg/dL  High:             160-189 mg/dL  Very High:        > or = 190 mg/dL    The following ratios of LDL to HDL and Total cholesterol to HDL are for information only:    LDL/HDL Ratio  Desirable:        <5  Optimal:          < or = 3.5    Total Cholesterol/HDL Ratio  Low Risk:         3.3-4.4  Average Risk:     4.4-7.1  Medium Risk:      7.1-11  High Risk:        >11       Comprehensive Metabolic Panel [[id number]]  (Abnormal) Collected:  10/08/19 0502    Specimen:  Blood Updated:  10/08/19 0628     Glucose 166 mg/dL      BUN 13 mg/dL      Creatinine 0.90 mg/dL      Sodium 136 mmol/L      Potassium 3.9 mmol/L      Chloride 99 mmol/L      CO2 25.0 mmol/L      Calcium 8.6 mg/dL      Total Protein 6.0 g/dL       Albumin 3.70 g/dL      ALT (SGPT) 67 U/L      AST (SGOT) 51 U/L      Alkaline Phosphatase 54 U/L      Total Bilirubin 0.6 mg/dL      eGFR Non African Amer 88 mL/min/1.73      Globulin 2.3 gm/dL      A/G Ratio 1.6 g/dL      BUN/Creatinine Ratio 14.4     Anion Gap 15.9 mmol/L     TSH [[id number]]  (Normal) Collected:  10/08/19 0502    Specimen:  Blood Updated:  10/08/19 0628     TSH 3.370 uIU/mL      Comment: Results may be falsely decreased if patient taking Biotin.       Vitamin B12 [[id number]]  (Normal) Collected:  10/08/19 0502    Specimen:  Blood Updated:  10/08/19 0628     Vitamin B-12 204 pg/mL      Comment: Results may be falsely increased if patient taking Biotin.       CBC & Differential [[id number]] Collected:  10/08/19 0502    Specimen:  Blood Updated:  10/08/19 0535    Narrative:       The following orders were created for panel order CBC & Differential.  Procedure                               Abnormality         Status                     ---------                               -----------         ------                     CBC Auto Differential[[id number]]        Abnormal            Final result                 Please view results for these tests on the individual orders.    CBC Auto Differential [[id number]]  (Abnormal) Collected:  10/08/19 0502    Specimen:  Blood Updated:  10/08/19 0535     WBC 5.30 10*3/mm3      RBC 4.35 10*6/mm3      Hemoglobin 13.9 g/dL      Hematocrit 39.2 %      MCV 90.0 fL      MCH 32.0 pg      MCHC 35.5 g/dL      RDW 13.9 %      RDW-SD 44.2 fl      MPV 9.9 fL      Platelets 127 10*3/mm3      Neutrophil % 54.5 %      Lymphocyte % 35.0 %      Monocyte % 8.0 %      Eosinophil % 1.9 %      Basophil % 0.6 %      Neutrophils, Absolute 2.90 10*3/mm3      Lymphocytes, Absolute 1.80 10*3/mm3      Monocytes, Absolute 0.40 10*3/mm3      Eosinophils, Absolute 0.10 10*3/mm3      Basophils, Absolute 0.00 10*3/mm3      nRBC 0.1 /100 WBC     POC Glucose Once [[id number]]  (Abnormal) Collected:   10/07/19 2050    Specimen:  Blood Updated:  10/07/19 2051     Glucose 145 mg/dL      Comment: Serial Number: [id number]:  [id number]       aPTT [[id number]]  (Normal) Collected:  10/07/19 1706    Specimen:  Blood Updated:  10/07/19 1817     PTT 24.6 seconds     Protime-INR [[id number]]  (Normal) Collected:  10/07/19 1706    Specimen:  Blood Updated:  10/07/19 1817     Protime 10.6 Seconds      INR 1.01    Hemoglobin A1c [[id number]]  (Abnormal) Collected:  10/07/19 1022    Specimen:  Blood Updated:  10/07/19 1539     Hemoglobin A1C 8.8 %     Narrative:       Hemoglobin A1C Reference Range:    <5.7 %        Normal  5.7-6.4 %     Increased risk for diabetes  > 6.4 %        Diabetes       These guidelines have been recommended by the American Diabetic Association for Hgb A1c.      The following 2010 guidelines have been recommended by the American Diabetes Association for Hemoglobin A1c.    HBA1c 5.7-6.4% Increased risk for future diabetes (pre-diabetes)  HBA1c     >6.4% Diabetes    Urinalysis With Culture If Indicated - Urine, Clean Catch [[id number]]  (Abnormal) Collected:  10/07/19 1031    Specimen:  Urine, Clean Catch Updated:  10/07/19 1102     Color, UA Yellow     Appearance, UA Clear     pH, UA 5.5     Specific Gravity, UA 1.018     Glucose, UA >=1000 mg/dL (3+)     Ketones, UA Negative     Bilirubin, UA Negative     Blood, UA Negative     Protein, UA Negative     Leuk Esterase, UA Negative     Nitrite, UA Negative     Urobilinogen, UA 0.2 E.U./dL    Narrative:       Urine microscopic not indicated.    Troponin [[id number]]  (Normal) Collected:  10/07/19 1022    Specimen:  Blood Updated:  10/07/19 1054     Troponin I <0.030 ng/mL     Narrative:       Troponin I Reference Range:    0.00-0.03  Negative.  Repeat testing in 4-6 hours if clinically indicated.    0.04-0.29  Suspicious for myocardial injury. Serial measurements and clinical  correlation may be necessary to confirm or exclude diagnosis of acute   coronary syndrome.  Repeat testing in 4-6 hours if indicated.     >0.29 Consistent with myocardial injury.  Recommend clinical and laboratory correlation.     Results my be falsely decreased if patient taking Biotin.     Comprehensive Metabolic Panel [[id number]]  (Abnormal) Collected:  10/07/19 1022    Specimen:  Blood Updated:  10/07/19 1053     Glucose 302 mg/dL      BUN 12 mg/dL      Creatinine 0.90 mg/dL      Sodium 134 mmol/L      Potassium 4.4 mmol/L      Chloride 100 mmol/L      CO2 23.0 mmol/L      Calcium 9.0 mg/dL      Total Protein 6.9 g/dL      Albumin 4.20 g/dL      ALT (SGPT) 68 U/L      AST (SGOT) 44 U/L      Alkaline Phosphatase 60 U/L      Total Bilirubin 0.5 mg/dL      eGFR Non African Amer 88 mL/min/1.73      Globulin 2.7 gm/dL      A/G Ratio 1.6 g/dL      BUN/Creatinine Ratio 13.3     Anion Gap 15.4 mmol/L     Lipase [[id number]]  (Normal) Collected:  10/07/19 1022    Specimen:  Blood Updated:  10/07/19 1053     Lipase 43 U/L     CBC & Differential [[id number]] Collected:  10/07/19 1022    Specimen:  Blood Updated:  10/07/19 1034    Narrative:       The following orders were created for panel order CBC & Differential.  Procedure                               Abnormality         Status                     ---------                               -----------         ------                     CBC Auto Differential[[id number]]        Abnormal            Final result                 Please view results for these tests on the individual orders.    CBC Auto Differential [[id number]]  (Abnormal) Collected:  10/07/19 1022    Specimen:  Blood Updated:  10/07/19 1034     WBC 4.70 10*3/mm3      RBC 4.89 10*6/mm3      Hemoglobin 15.3 g/dL      Hematocrit 44.3 %      MCV 90.5 fL      MCH 31.3 pg      MCHC 34.6 g/dL      RDW 13.9 %      RDW-SD 44.2 fl      MPV 9.9 fL      Platelets 134 10*3/mm3      Neutrophil % 66.6 %      Lymphocyte % 23.5 %      Monocyte % 7.4 %      Eosinophil % 1.5 %      Basophil % 1.0 %       Neutrophils, Absolute 3.10 10*3/mm3      Lymphocytes, Absolute 1.10 10*3/mm3      Monocytes, Absolute 0.30 10*3/mm3      Eosinophils, Absolute 0.10 10*3/mm3      Basophils, Absolute 0.00 10*3/mm3      nRBC 0.1 /100 WBC           Imaging Results (last 24 hours)     Procedure Component Value Units Date/Time    CT Angiogram Head With & Without Contrast [[id number]] Collected:  10/07/19 1553     Updated:  10/07/19 1603    Narrative:          DATE OF EXAM:  10/7/2019 2:43 PM     PROCEDURE:  CT ANGIOGRAM HEAD W WO CONTRAST-, CT ANGIOGRAM NECK W WO CONTRAST-     INDICATIONS:   dizziness. Hyperdense lesion in brain; R42-Dizziness and giddiness     COMPARISON:   No Comparisons Available     TECHNIQUE:  Routine transaxial slices were obtained through the head before and  after intravenous administration of 100 mL of Isovue 370. Reconstructed  coronal and sagittal images were also obtained. In addition, a 3-D  volume rendered images was obtained after postprocessing. Automated  exposure control and iterative reconstruction methods were used.     FINDINGS:  VASCULAR FINDINGS: The aortic arch is normal in caliber. The right  common carotid artery is widely patent without significant plaquing. The  right ICA is widely patent. The right subclavian and right vertebral  artery are widely patent throughout their courses. The left common and  left internal carotid artery are widely patent without plaquing. The  left subclavian and left vertebral artery are widely patent throughout  the courses. Bilateral ACAs are widely patent. Bilateral MCAs are widely  patent. There is mild aneurysmal dilation of the basilar tip measuring  5.5 mm.      NONVASCULAR FINDINGS: The hyperdense area within the right centrum  semiovale is not well appreciated on contrast-enhanced images. No  enhancing lesions are identified within the brain parenchyma. Ventricles  and sulci are age-appropriate. Mild right maxillary sinus disease. No  aggressive appearing  lytic or sclerotic bone lesions. Limited evaluation  of the lung apices are unremarkable. The thyroid is normal. No cervical  adenopathy. Parotid gland is normal. Submandibular glands are normal. No  cervical masses are identified. No aggressive appearing lytic or  sclerotic bone lesions are identified.       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     Electronically Signed By-Kory Hayden On:10/7/2019 4:01 PM  This report was finalized on [id number] by  Kory Hayden, .    CT Angiogram Neck With & Without Contrast [[id number]] Collected:  10/07/19 1553     Updated:  10/07/19 1603    Narrative:          DATE OF EXAM:  10/7/2019 2:43 PM     PROCEDURE:  CT ANGIOGRAM HEAD W WO CONTRAST-, CT ANGIOGRAM NECK W WO CONTRAST-     INDICATIONS:   dizziness. Hyperdense lesion in brain; R42-Dizziness and giddiness     COMPARISON:   No Comparisons Available     TECHNIQUE:  Routine transaxial slices were obtained through the head before and  after intravenous administration of 100 mL of Isovue 370. Reconstructed  coronal and sagittal images were also obtained. In addition, a 3-D  volume rendered images was obtained after postprocessing. Automated  exposure control and iterative reconstruction methods were used.     FINDINGS:  VASCULAR FINDINGS: The aortic arch is normal in caliber. The right  common carotid artery is widely patent without significant plaquing. The  right ICA is widely patent. The right subclavian and right vertebral  artery are widely patent throughout their courses. The left common and  left internal carotid artery are widely patent without plaquing. The  left subclavian and left vertebral artery are widely patent throughout  the courses. Bilateral ACAs are widely patent. Bilateral MCAs are widely  patent. There is mild aneurysmal dilation of the basilar tip measuring  5.5 mm.      NONVASCULAR FINDINGS: The hyperdense area within the right centrum  semiovale is not well appreciated on  contrast-enhanced images. No  enhancing lesions are identified within the brain parenchyma. Ventricles  and sulci are age-appropriate. Mild right maxillary sinus disease. No  aggressive appearing lytic or sclerotic bone lesions. Limited evaluation  of the lung apices are unremarkable. The thyroid is normal. No cervical  adenopathy. Parotid gland is normal. Submandibular glands are normal. No  cervical masses are identified. No aggressive appearing lytic or  sclerotic bone lesions are identified.       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     Electronically Signed By-Kory Hayden On:10/7/2019 4:01 PM  This report was finalized on [id number] by  Kory Hayden, .    CT Head Without Contrast [[id number]] Collected:  10/07/19 1058     Updated:  10/07/19 1104    Narrative:          DATE OF EXAM:  10/7/2019 10:48 AM     PROCEDURE:   CT HEAD WO CONTRAST-     INDICATIONS:   Syncopal episode, nausea and dizziness, hypertension.     COMPARISON:  No Comparisons Available     TECHNIQUE:   Routine transaxial cuts were obtained through the head without the  administration of contrast. Automated exposure control and iterative  reconstruction methods were used.      FINDINGS:  The cerebral sulci, fissures, ventricles, and basal cisterns are within  range of normal. There is a 6 mm hyperdense lesion in the posterior  aspect of the right centrum semiovale. This is in the deep white matter  of the right parietal lobe. There is no surrounding vasogenic edema.  This could represent a dystrophic calcification from a previous ischemic  event, a small acute hyperdense hemorrhage, or a neoplastic process such  as lymphoma or a neuroendocrine tumor. I would recommend an MRI of the  brain with and without contrast for follow-up. There is no midline shift  or suspicious extra-axial fluid collections. The orbital contents are  normal. The paranasal and mastoid sinuses are clear. The calvarium is  unremarkable.         Impression:          1. 6 mm hyperdense lesion in the posterior aspect of the right centrum  semiovale. Diagnostic considerations are discussed above.  2. I would recommend an MRI of the brain with and without contrast for  follow-up.     Electronically Signed By-Hasmukh Streeter On:10/7/2019 11:01 AM  This report was finalized on [id number] by  Hasmukh Streeter, .            Assessment/Plan       Dizziness      PLAN:   Mr. Gill has undergone 1 CT head without contrast along with a CT angiogram of his head and CT angiogram of his neck.  These have been reviewed by Dr. Garcia.  Hyperdense lesion appears to be a small intracranial bleed.  We will follow this a repeat CT head this morning.  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  If there is no change in the CT Head, the patient is stable for discharge from a neurosurgery standpoint.  Keep blood pressure less than 150 systolic. He may resume his Plavix and Aspirin on discharge    Patient unable to undergo MRI due to MRI incompatible defibrillator.    Pending results of the repeat CT Head this morning, the plan is to sign off on the patient today from a neurosurgery standpoint have him follow-up in the office in 1 month for repeat CT head without contrast.  Patient will also need follow-up for the basilar tip aneurysm.  These were discussed with the patient at bedside this morning and he is agreeable.    Patient will need close follow-up with his primary care provider for better blood glucose control.     I discussed the patients findings and recommendations with patient    Laxmi Brooks PA-C  10/08/19  8:51 AM

## 2019-10-08 NOTE — PLAN OF CARE
Problem: Patient Care Overview  Goal: Plan of Care Review  Outcome: Ongoing (interventions implemented as appropriate)   10/08/19 0419   Coping/Psychosocial   Plan of Care Reviewed With patient   Plan of Care Review   Progress no change   OTHER   Outcome Summary No complaints of dizziness during shift. VS stable. NIH of 0. CT of head showed 6 mm lesion. Pt will have repeat CT this AM.        Problem: Stroke (Hemorrhagic) (Adult)  Goal: Signs and Symptoms of Listed Potential Problems Will be Absent, Minimized or Managed (Stroke)  Outcome: Ongoing (interventions implemented as appropriate)

## 2019-10-09 ENCOUNTER — READMISSION MANAGEMENT (OUTPATIENT)
Dept: CALL CENTER | Facility: HOSPITAL | Age: 54
End: 2019-10-09

## 2019-10-09 LAB — GLUCOSE BLDC GLUCOMTR-MCNC: 185 MG/DL (ref 70–105)

## 2019-10-09 NOTE — PROGRESS NOTES
Case Management Discharge Note    Final Note: home      Final Discharge Disposition Code: 01 - home or self-care

## 2019-10-09 NOTE — OUTREACH NOTE
Prep Survey      Responses   Facility patient discharged from?  [facility]   Is patient eligible?  Yes   Discharge diagnosis  small ICH on antithrombotics,  dizziness,  basilar tip aneurysm (monitor closely), Vit B12 def, uncontroled DM2   Does the patient have one of the following disease processes/diagnoses(primary or secondary)?  Other   Does the patient have Home health ordered?  No   Is there a DME ordered?  No   Medication alerts for this patient  ok to resume ASA and Plavix   General alerts for this patient  monitor BP closely with goal of less than 130/80   Prep survey completed?  Yes          Karie Dale RN

## 2019-10-10 ENCOUNTER — READMISSION MANAGEMENT (OUTPATIENT)
Dept: CALL CENTER | Facility: HOSPITAL | Age: 54
End: 2019-10-10

## 2019-10-10 LAB
BH CV ECHO MEAS - % IVS THICK: 24 %
BH CV ECHO MEAS - % LVPW THICK: 6.7 %
BH CV ECHO MEAS - ACS: 1.9 CM
BH CV ECHO MEAS - AO MAX PG (FULL): -0.3 MMHG
BH CV ECHO MEAS - AO MAX PG: 4.6 MMHG
BH CV ECHO MEAS - AO MEAN PG (FULL): -0.14 MMHG
BH CV ECHO MEAS - AO MEAN PG: 2.4 MMHG
BH CV ECHO MEAS - AO ROOT AREA (BSA CORRECTED): 1.8
BH CV ECHO MEAS - AO ROOT AREA: 10.5 CM^2
BH CV ECHO MEAS - AO ROOT DIAM: 3.7 CM
BH CV ECHO MEAS - AO V2 MAX: 107.7 CM/SEC
BH CV ECHO MEAS - AO V2 MEAN: 72.2 CM/SEC
BH CV ECHO MEAS - AO V2 VTI: 23.5 CM
BH CV ECHO MEAS - AORTIC HR: 184.4 BPM
BH CV ECHO MEAS - AORTIC R-R: 0.33 SEC
BH CV ECHO MEAS - AVA(I,A): 3.6 CM^2
BH CV ECHO MEAS - AVA(I,D): 3.6 CM^2
BH CV ECHO MEAS - AVA(V,A): 3.9 CM^2
BH CV ECHO MEAS - AVA(V,D): 3.9 CM^2
BH CV ECHO MEAS - BSA(HAYCOCK): 2.2 M^2
BH CV ECHO MEAS - BSA: 2.1 M^2
BH CV ECHO MEAS - BZI_BMI: 32.9 KILOGRAMS/M^2
BH CV ECHO MEAS - BZI_METRIC_HEIGHT: 170.2 CM
BH CV ECHO MEAS - BZI_METRIC_WEIGHT: 95.3 KG
BH CV ECHO MEAS - CI(AO): 22 L/MIN/M^2
BH CV ECHO MEAS - CI(LVOT): 7.6 L/MIN/M^2
BH CV ECHO MEAS - CO(AO): 45.4 L/MIN
BH CV ECHO MEAS - CO(LVOT): 15.6 L/MIN
BH CV ECHO MEAS - EDV(CUBED): 157.3 ML
BH CV ECHO MEAS - EDV(MOD-SP2): 186.8 ML
BH CV ECHO MEAS - EDV(MOD-SP4): 200.6 ML
BH CV ECHO MEAS - EDV(TEICH): 141.2 ML
BH CV ECHO MEAS - EF(CUBED): 47.1 %
BH CV ECHO MEAS - EF(MOD-SP2): 26.3 %
BH CV ECHO MEAS - EF(MOD-SP4): 36.8 %
BH CV ECHO MEAS - EF(TEICH): 39 %
BH CV ECHO MEAS - ESV(CUBED): 83.2 ML
BH CV ECHO MEAS - ESV(MOD-SP2): 137.8 ML
BH CV ECHO MEAS - ESV(MOD-SP4): 126.7 ML
BH CV ECHO MEAS - ESV(TEICH): 86.1 ML
BH CV ECHO MEAS - FS: 19.1 %
BH CV ECHO MEAS - IVS/LVPW: 0.99
BH CV ECHO MEAS - IVSD: 1.1 CM
BH CV ECHO MEAS - IVSS: 1.3 CM
BH CV ECHO MEAS - LA DIMENSION(2D): 3.6 CM
BH CV ECHO MEAS - LV DIASTOLIC VOL/BSA (35-75): 97.2 ML/M^2
BH CV ECHO MEAS - LV MASS(C)D: 228 GRAMS
BH CV ECHO MEAS - LV MASS(C)DI: 110.4 GRAMS/M^2
BH CV ECHO MEAS - LV MASS(C)S: 198.8 GRAMS
BH CV ECHO MEAS - LV MASS(C)SI: 96.3 GRAMS/M^2
BH CV ECHO MEAS - LV MAX PG: 4.9 MMHG
BH CV ECHO MEAS - LV MEAN PG: 2.5 MMHG
BH CV ECHO MEAS - LV SYSTOLIC VOL/BSA (12-30): 61.4 ML/M^2
BH CV ECHO MEAS - LV V1 MAX: 111.2 CM/SEC
BH CV ECHO MEAS - LV V1 MEAN: 71.9 CM/SEC
BH CV ECHO MEAS - LV V1 VTI: 22.2 CM
BH CV ECHO MEAS - LVIDD: 5.4 CM
BH CV ECHO MEAS - LVIDS: 4.4 CM
BH CV ECHO MEAS - LVOT AREA: 3.8 CM^2
BH CV ECHO MEAS - LVOT DIAM: 2.2 CM
BH CV ECHO MEAS - LVPWD: 1.1 CM
BH CV ECHO MEAS - LVPWS: 1.2 CM
BH CV ECHO MEAS - MV A MAX VEL: 90.2 CM/SEC
BH CV ECHO MEAS - MV DEC SLOPE: 311.9 CM/SEC^2
BH CV ECHO MEAS - MV DEC TIME: 0.23 SEC
BH CV ECHO MEAS - MV E MAX VEL: 72.2 CM/SEC
BH CV ECHO MEAS - MV E/A: 0.8
BH CV ECHO MEAS - MV MAX PG: 3.4 MMHG
BH CV ECHO MEAS - MV MEAN PG: 1.3 MMHG
BH CV ECHO MEAS - MV V2 MAX: 92.7 CM/SEC
BH CV ECHO MEAS - MV V2 MEAN: 54.2 CM/SEC
BH CV ECHO MEAS - MV V2 VTI: 29.7 CM
BH CV ECHO MEAS - MVA(VTI): 2.9 CM^2
BH CV ECHO MEAS - PA ACC TIME: 0.1 SEC
BH CV ECHO MEAS - PA MAX PG (FULL): 0.02 MMHG
BH CV ECHO MEAS - PA MAX PG: 1.9 MMHG
BH CV ECHO MEAS - PA MEAN PG (FULL): 0.96 MMHG
BH CV ECHO MEAS - PA MEAN PG: 1.8 MMHG
BH CV ECHO MEAS - PA PR(ACCEL): 35.3 MMHG
BH CV ECHO MEAS - PA V2 MAX: 48.8 CM/SEC
BH CV ECHO MEAS - PA V2 MEAN: 63.6 CM/SEC
BH CV ECHO MEAS - PA V2 VTI: 16.5 CM
BH CV ECHO MEAS - PI MAX PG: 0 MMHG
BH CV ECHO MEAS - PI MAX VEL: 0.13 CM/SEC
BH CV ECHO MEAS - RAP SYSTOLE: 3 MMHG
BH CV ECHO MEAS - RV MAX PG: 1.9 MMHG
BH CV ECHO MEAS - RV MEAN PG: 0.89 MMHG
BH CV ECHO MEAS - RV V1 MAX: 68.1 CM/SEC
BH CV ECHO MEAS - RV V1 MEAN: 44.2 CM/SEC
BH CV ECHO MEAS - RV V1 VTI: 14.1 CM
BH CV ECHO MEAS - RVDD: 2.9 CM
BH CV ECHO MEAS - RVSP: 23 MMHG
BH CV ECHO MEAS - SI(AO): 119.2 ML/M^2
BH CV ECHO MEAS - SI(CUBED): 35.9 ML/M^2
BH CV ECHO MEAS - SI(LVOT): 41.1 ML/M^2
BH CV ECHO MEAS - SI(MOD-SP2): 23.8 ML/M^2
BH CV ECHO MEAS - SI(MOD-SP4): 35.8 ML/M^2
BH CV ECHO MEAS - SI(TEICH): 26.7 ML/M^2
BH CV ECHO MEAS - SV(AO): 246 ML
BH CV ECHO MEAS - SV(CUBED): 74 ML
BH CV ECHO MEAS - SV(LVOT): 84.8 ML
BH CV ECHO MEAS - SV(MOD-SP2): 49.1 ML
BH CV ECHO MEAS - SV(MOD-SP4): 73.8 ML
BH CV ECHO MEAS - SV(TEICH): 55.1 ML
BH CV ECHO MEAS - TR MAX VEL: 223.5 CM/SEC
MAXIMAL PREDICTED HEART RATE: 167 BPM
STRESS TARGET HR: 142 BPM

## 2019-10-10 PROCEDURE — 93306 TTE W/DOPPLER COMPLETE: CPT | Performed by: INTERNAL MEDICINE

## 2019-10-10 NOTE — OUTREACH NOTE
Medical Week 1 Survey      Responses   Facility patient discharged from?  [facility]   Does the patient have one of the following disease processes/diagnoses(primary or secondary)?  Other   Is there a successful TCM telephone encounter documented?  No   Week 1 attempt successful?  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  Yes   Is the patient having any side effects they believe may be caused by any medication additions or changes?  No   Does the patient have all medications ordered at discharge?  Yes   Is the patient taking all medications as directed (includes completed medication regime)?  Yes   Does the patient have a primary care provider?   Yes   Does the patient have an appointment with their PCP within 7 days of discharge?  Yes   Has the patient kept scheduled appointments due by today?  N/A   Has home health visited the patient within 72 hours of discharge?  N/A   Did the patient receive a copy of their discharge instructions?  Yes   Nursing interventions  Reviewed instructions with patient   What is the patient's perception of their health status since discharge?  Improving   Is the patient/caregiver able to teach back signs and symptoms related to disease process for when to call PCP?  Yes   Is the patient/caregiver able to teach back signs and symptoms related to disease process for when to call 911?  Yes   Is the patient/caregiver able to teach back the hierarchy of who to call/visit for symptoms/problems? PCP, Specialist, Home health nurse, Urgent Care, ED, 911  Yes   Week 1 call completed?  Yes   Graduated  Yes   Did the patient feel the follow up calls were helpful during their  recovery period?  Yes   Was the number of calls appropriate?  Yes          Huong Minaya LPN

## 2019-10-16 DIAGNOSIS — R42 DIZZINESS: Primary | ICD-10-CM

## 2019-10-16 DIAGNOSIS — I62.9 INTRACRANIAL BLEED (HCC): ICD-10-CM

## 2019-10-16 PROBLEM — I25.10 CORONARY ARTERY DISEASE: Status: ACTIVE | Noted: 2019-10-16

## 2019-10-16 PROBLEM — I10 HYPERTENSION: Status: ACTIVE | Noted: 2019-10-16

## 2019-10-16 PROBLEM — E78.5 HYPERLIPIDEMIA: Status: ACTIVE | Noted: 2019-10-16

## 2019-10-21 PROBLEM — E78.2 MIXED HYPERLIPIDEMIA: Status: ACTIVE | Noted: 2019-10-16

## 2019-10-21 PROBLEM — I25.10 CORONARY ARTERY DISEASE INVOLVING NATIVE CORONARY ARTERY OF NATIVE HEART WITHOUT ANGINA PECTORIS: Status: ACTIVE | Noted: 2019-10-21

## 2019-10-23 ENCOUNTER — HOSPITAL ENCOUNTER (OUTPATIENT)
Dept: CT IMAGING | Facility: HOSPITAL | Age: 54
Discharge: HOME | End: 2019-10-23
Admitting: NEUROLOGICAL SURGERY

## 2019-10-23 DIAGNOSIS — R42 DIZZINESS: ICD-10-CM

## 2019-10-23 DIAGNOSIS — I62.9 INTRACRANIAL BLEED (HCC): ICD-10-CM

## 2019-10-23 PROCEDURE — 70450 CT HEAD/BRAIN W/O DYE: CPT

## 2019-10-31 ENCOUNTER — CLINICAL SUPPORT NO REQUIREMENTS (OUTPATIENT)
Dept: CARDIOLOGY | Facility: CLINIC | Age: 54
End: 2019-10-31

## 2019-10-31 ENCOUNTER — OFFICE VISIT (OUTPATIENT)
Dept: CARDIOLOGY | Facility: CLINIC | Age: 54
End: 2019-10-31

## 2019-10-31 VITALS
HEART RATE: 70 BPM | HEIGHT: 67 IN | BODY MASS INDEX: 32.65 KG/M2 | DIASTOLIC BLOOD PRESSURE: 80 MMHG | WEIGHT: 208 LBS | SYSTOLIC BLOOD PRESSURE: 162 MMHG

## 2019-10-31 DIAGNOSIS — I10 ESSENTIAL HYPERTENSION: ICD-10-CM

## 2019-10-31 DIAGNOSIS — I47.9 PAROXYSMAL TACHYCARDIA (HCC): ICD-10-CM

## 2019-10-31 DIAGNOSIS — I42.9 CARDIOMYOPATHY, UNSPECIFIED TYPE (HCC): ICD-10-CM

## 2019-10-31 DIAGNOSIS — I42.0 DILATED CARDIOMYOPATHY (HCC): Primary | ICD-10-CM

## 2019-10-31 DIAGNOSIS — E78.2 MIXED HYPERLIPIDEMIA: Primary | ICD-10-CM

## 2019-10-31 DIAGNOSIS — Z95.810 PRESENCE OF AUTOMATIC IMPLANTABLE CARDIOVERTER-DEFIBRILLATOR: ICD-10-CM

## 2019-10-31 DIAGNOSIS — I25.10 CORONARY ARTERY DISEASE INVOLVING NATIVE CORONARY ARTERY OF NATIVE HEART WITHOUT ANGINA PECTORIS: ICD-10-CM

## 2019-10-31 PROCEDURE — 99214 OFFICE O/P EST MOD 30 MIN: CPT | Performed by: INTERNAL MEDICINE

## 2019-10-31 PROCEDURE — 93282 PRGRMG EVAL IMPLANTABLE DFB: CPT | Performed by: NURSE PRACTITIONER

## 2019-10-31 RX ORDER — ATORVASTATIN CALCIUM 40 MG/1
40 TABLET, FILM COATED ORAL DAILY
COMMUNITY
End: 2022-06-30 | Stop reason: SDUPTHER

## 2019-10-31 NOTE — PROGRESS NOTES
Date of Office Visit: 10/31/2019  Encounter Provider: Dr. Ashok Fowler  Place of Service: [facility] CARDIOLOGY [place]  Patient Name: Herve Gill  :1965  Rogerio Gordillo MD    Chief Complaint   Patient presents with   • Syncope     2 year follow up device check/echo   • Hypertension   • Hyperlipidemia   • Rapid Heart Rate   • Coronary Artery Disease     History of Present Illness    I am pleased to see Mr. Gill in my office today as a followup.    As you know, patient is 54 years old white gentleman whose past medical history is significant for hypertension, hyperlipidemia, coronary artery disease, status post coronary artery stenting, cardiomyopathy, status post AICD implantation, who came today for followup.    In , patient had acute myocardial infarction when he was in [place] and cardiac catheterization showed 100% occlusion of the large OM branch of LCx. LAD was free of disease. Severe left ventricle dysfunction noted.  underwent AICD implantation. In , patient had repeat echocardiogram which showed LVEF of 35-40% and akinetic inferior wall was noted.    Since the previous visit, patient was admitted at [facility] with symptom of nausea and vomiting and dizziness.  Patient was found to be dehydrated.  Patient was treated symptomatically and was discharged.  His blood pressure was low.  Medications was adjusted.    In 2019, echocardiogram showed EF of 25 to 30%.  No significant valvular heart disease noted    Patient missed his previous appointment in .    Since the discharge from the hospital, patient is overall doing well.  No further episode of dizziness and lightheadedness or sickness or nausea or vomiting.  Patient denies any chest pain.  No shortness of breath.  No orthopnea, PND, syncope or presyncope.  No leg edema noted.    ICD is interrogated today and it is functioning appropriately.  No change in programming is done.    At this  stage, I would recommend that patient should monitor the blood pressure at home and call my office in 1 month.  He believes that his blood pressure at home has been stable and it is sporadic elevation of blood pressure today.  Patient is also advised to be compliant with appointments.  Patient is reaching end of life.  Patient would need ICD interrogation in 6 months.  I also discussed with him about possibility of starting Entresto.  Patient wants to discuss with insurance company about possible co-pays.  Patient would call my office about this information later    Past Medical History:   Diagnosis Date   • Cardiomyopathy (CMS/HCC)    • Diabetes mellitus (CMS/HCC)    • Heart disease    • Hyperlipidemia    • Hypertension    • Tachycardia          Past Surgical History:   Procedure Laterality Date   • CARDIAC CATHETERIZATION     • CORONARY STENT PLACEMENT     • ECHO - CONVERTED  2019   • INSERT / REPLACE / REMOVE PACEMAKER     • INTERNAL CARDIAC DEFIBRILLATOR INSERTION Left            Current Outpatient Medications:   •  aspirin 81 MG EC tablet, Take 1 tablet by mouth Daily., Disp: , Rfl:   •  atorvastatin (LIPITOR) 80 MG tablet, Take 80 mg by mouth Daily., Disp: , Rfl:   •  carvedilol (COREG) 25 MG tablet, Take 25 mg by mouth 2 (Two) Times a Day With Meals., Disp: , Rfl:   •  clopidogrel (PLAVIX) 75 MG tablet, Take 1 tablet by mouth Daily., Disp: , Rfl:   •  enalapril (VASOTEC) 10 MG tablet, Take 1 tablet by mouth Daily. Hold if bp below 110/60, Disp: , Rfl:   •  metFORMIN (GLUCOPHAGE) 500 MG tablet, Take 500 mg by mouth 2 (Two) Times a Day With Meals., Disp: , Rfl:   •  vitamin B-12 (VITAMIN B-12) 1000 MCG tablet, Take 1 tablet by mouth Daily., Disp: 30 tablet, Rfl: 1      Social History     Socioeconomic History   • Marital status:      Spouse name: Not on file   • Number of children: Not on file   • Years of education: Not on file   • Highest education level: Not on file   Tobacco Use   • Smoking status:  "Never Smoker   • Smokeless tobacco: Never Used   Substance and Sexual Activity   • Alcohol use: Yes     Comment: occasional    • Drug use: No   • Sexual activity: Defer         Review of Systems   Constitution: Negative for chills and fever.   HENT: Negative for ear discharge and nosebleeds.    Eyes: Negative for discharge and redness.   Cardiovascular: Negative for chest pain, orthopnea, palpitations, paroxysmal nocturnal dyspnea and syncope.   Respiratory: Positive for shortness of breath. Negative for cough and wheezing.    Endocrine: Negative for heat intolerance.   Skin: Negative for rash.   Musculoskeletal: Negative for arthritis and myalgias.   Gastrointestinal: Negative for abdominal pain, melena, nausea and vomiting.   Genitourinary: Negative for dysuria and hematuria.   Neurological: Negative for dizziness, light-headedness, numbness and tremors.   Psychiatric/Behavioral: Negative for depression. The patient is not nervous/anxious.        Procedures    Procedures    No orders to display           Objective:    /80   Pulse 70   Ht 170.2 cm (67\")   Wt 94.3 kg (208 lb)   BMI 32.58 kg/m²         Physical Exam   Constitutional: He is oriented to person, place, and time. He appears well-developed and well-nourished.   HENT:   Head: Normocephalic and atraumatic.   Eyes: No scleral icterus.   Neck: No thyromegaly present.   Cardiovascular: Normal rate, regular rhythm and normal heart sounds. Exam reveals no gallop and no friction rub.   No murmur heard.  Pulmonary/Chest: Effort normal and breath sounds normal. No respiratory distress. He has no wheezes. He has no rales.   Abdominal: There is no tenderness.   Musculoskeletal: He exhibits no edema.   Lymphadenopathy:     He has no cervical adenopathy.   Neurological: He is alert and oriented to person, place, and time.   Skin: No rash noted. No erythema.   Psychiatric: He has a normal mood and affect.           Assessment:       Diagnosis Plan   1. Mixed " hyperlipidemia     2. Essential hypertension     3. Paroxysmal tachycardia (CMS/HCC)     4. Presence of automatic implantable cardioverter-defibrillator     5. Cardiomyopathy, unspecified type (CMS/HCC)     6. Coronary artery disease involving native coronary artery of native heart without angina pectoris              Plan:       At this stage, I would recommend that patient should proceed with current treatment.  Patient is advised to monitor the blood pressure at home call my office.  Pacemaker/ICD would be interrogated on next visit.

## 2019-11-01 NOTE — PROGRESS NOTES
DEVICE INTERROGATION:  IN OFFICE    DEVICE TYPE:   Single Chamber ICD    :   [employer]    BATTERY:  Stable    TIME TO ELECTIVE REPLACEMENT INDICATORS:   3 years     CHARGE TIME:   10 seconds        LEAD DATA:        Ventricular:     11.6 mV,  460 Ohms,  0.5 V@ 0.5 ms    LV:          Ventricular pacing percentage:  <1%      Arrhythmia Logbook Reviewed:  No VT or VF episodes        Summary:    Stable Device Function    No significant arhythmia burden.     Battery status is stable.      NEXT IN OFFICE DEVICE CHECK DUE:  6 months    REMOTE DEVICE INTERROGATIONS:  Ordered new home monitor

## 2019-11-08 ENCOUNTER — TELEPHONE (OUTPATIENT)
Dept: CARDIOLOGY | Facility: CLINIC | Age: 54
End: 2019-11-08

## 2019-12-05 ENCOUNTER — TELEPHONE (OUTPATIENT)
Dept: NEUROSURGERY | Facility: CLINIC | Age: 54
End: 2019-12-05

## 2019-12-05 DIAGNOSIS — I62.9 INTRACRANIAL BLEED (HCC): ICD-10-CM

## 2019-12-05 DIAGNOSIS — R42 DIZZINESS: Primary | ICD-10-CM

## 2019-12-13 DIAGNOSIS — I62.9 INTRACRANIAL BLEED (HCC): Primary | ICD-10-CM

## 2020-01-02 ENCOUNTER — TELEPHONE (OUTPATIENT)
Dept: NEUROSURGERY | Facility: CLINIC | Age: 55
End: 2020-01-02

## 2020-01-02 NOTE — TELEPHONE ENCOUNTER
Scheduling not able to reach the patient, referral routed back to us. Call placed to patient, left message on machine to return call to office regarding scheduling of MRI.

## 2020-01-02 NOTE — TELEPHONE ENCOUNTER
Patient spoke with front office, given scheduling phone number and advised to contact us back to make follow up after have date for MRI.

## 2020-04-29 ENCOUNTER — TELEMEDICINE (OUTPATIENT)
Dept: CARDIOLOGY | Facility: CLINIC | Age: 55
End: 2020-04-29

## 2020-04-29 VITALS — HEIGHT: 67 IN | BODY MASS INDEX: 32.65 KG/M2 | WEIGHT: 208 LBS

## 2020-04-29 DIAGNOSIS — I10 ESSENTIAL HYPERTENSION: ICD-10-CM

## 2020-04-29 DIAGNOSIS — E78.2 MIXED HYPERLIPIDEMIA: Primary | ICD-10-CM

## 2020-04-29 DIAGNOSIS — I25.10 CORONARY ARTERY DISEASE INVOLVING NATIVE CORONARY ARTERY OF NATIVE HEART WITHOUT ANGINA PECTORIS: ICD-10-CM

## 2020-04-29 DIAGNOSIS — I42.0 DILATED CARDIOMYOPATHY (HCC): ICD-10-CM

## 2020-04-29 DIAGNOSIS — I47.9 PAROXYSMAL TACHYCARDIA (HCC): ICD-10-CM

## 2020-04-29 PROCEDURE — 99213 OFFICE O/P EST LOW 20 MIN: CPT | Performed by: INTERNAL MEDICINE

## 2020-04-29 NOTE — PROGRESS NOTES
Date of Office Visit: 2020  Encounter Provider: Dr. Ashok Fowler  Place of Service: [facility] CARDIOLOGY [place]  Patient Name: Herve Gill  :1965  Rogerio Gordillo MD  You have chosen to receive care through a telephone visit. Do you consent to use a telephone visit for your medical care today? Yes  Chief Complaint   Patient presents with   • Cardiomyopathy     6 month follow up/telephone visit   • Coronary Artery Disease   • Hypertension   • Hyperlipidemia     History of Present Illness    I am pleased to have video visit with Mr. Gill in my office today as a followup.    As you know, patient is 54 years old white gentleman whose past medical history is significant for hypertension, hyperlipidemia, coronary artery disease, status post coronary artery stenting, cardiomyopathy, status post AICD implantation, who had video visit.    In , patient had acute myocardial infarction when he was in [place] and cardiac catheterization showed 100% occlusion of the large OM branch of LCx. LAD was free of disease. Severe left ventricle dysfunction noted.  underwent AICD implantation. In , patient had repeat echocardiogram which showed LVEF of 35-40% and akinetic inferior wall was noted.In 2019, echocardiogram showed EF of 25 to 30%.  No significant valvular heart disease noted.    Since the previous visit, patient is overall doing fairly well from cardiovascular standpoint.  Patient is fairly active.  He is taking walk in his neighborhood.  Patient denies any symptom of chest pain or tightness or heaviness.  No orthopnea, PND, syncope or presyncope.  No leg edema noted.  No palpitation.    AICD is not interrogated on this visit.    Clinically patient is doing fairly well.  I will continue current treatment.  ICD would be interrogated on next visit.  I will see the patient in 6 months.    Past Medical History:   Diagnosis Date   • Cardiomyopathy (CMS/HCC)    • Diabetes  mellitus (CMS/HCC)    • Heart disease    • Hyperlipidemia    • Hypertension    • Tachycardia          Past Surgical History:   Procedure Laterality Date   • CARDIAC CATHETERIZATION     • CORONARY STENT PLACEMENT     • ECHO - CONVERTED  2019   • INSERT / REPLACE / REMOVE PACEMAKER     • INTERNAL CARDIAC DEFIBRILLATOR INSERTION Left            Current Outpatient Medications:   •  aspirin 81 MG EC tablet, Take 1 tablet by mouth Daily., Disp: , Rfl:   •  atorvastatin (LIPITOR) 80 MG tablet, Take 80 mg by mouth Daily., Disp: , Rfl:   •  carvedilol (COREG) 25 MG tablet, Take 25 mg by mouth 2 (Two) Times a Day With Meals., Disp: , Rfl:   •  clopidogrel (PLAVIX) 75 MG tablet, Take 1 tablet by mouth Daily., Disp: , Rfl:   •  enalapril (VASOTEC) 10 MG tablet, Take 1 tablet by mouth Daily. Hold if bp below 110/60 (Patient taking differently: Take 10 mg by mouth 2 (Two) Times a Day. Hold if bp below 110/60), Disp: , Rfl:   •  metFORMIN (GLUCOPHAGE) 500 MG tablet, Take 500 mg by mouth 2 (Two) Times a Day With Meals., Disp: , Rfl:   •  vitamin B-12 (VITAMIN B-12) 1000 MCG tablet, Take 1 tablet by mouth Daily., Disp: 30 tablet, Rfl: 1      Social History     Socioeconomic History   • Marital status:      Spouse name: Not on file   • Number of children: Not on file   • Years of education: Not on file   • Highest education level: Not on file   Tobacco Use   • Smoking status: Never Smoker   • Smokeless tobacco: Never Used   Substance and Sexual Activity   • Alcohol use: Yes     Comment: occasional    • Drug use: No   • Sexual activity: Defer         Review of Systems   Constitution: Negative for chills and fever.   HENT: Negative for ear discharge and nosebleeds.    Eyes: Negative for discharge and redness.   Cardiovascular: Negative for chest pain, orthopnea, palpitations, paroxysmal nocturnal dyspnea and syncope.   Respiratory: Positive for shortness of breath. Negative for cough and wheezing.    Endocrine: Negative for heat  "intolerance.   Skin: Negative for rash.   Musculoskeletal: Positive for arthritis. Negative for myalgias.   Gastrointestinal: Negative for abdominal pain, melena, nausea and vomiting.   Genitourinary: Negative for dysuria and hematuria.   Neurological: Negative for dizziness, light-headedness, numbness and tremors.   Psychiatric/Behavioral: Negative for depression. The patient is not nervous/anxious.        Procedures    Procedures    No orders to display           Objective:    Ht 170.2 cm (67.01\")   Wt 94.3 kg (208 lb)   BMI 32.57 kg/m²         Physical Exam   Constitutional: He is oriented to person, place, and time. He appears well-developed and well-nourished.   Musculoskeletal: He exhibits no edema.   Neurological: He is alert and oriented to person, place, and time.           Assessment:       Diagnosis Plan   1. Mixed hyperlipidemia     2. Essential hypertension     3. Paroxysmal tachycardia (CMS/HCC)     4. Dilated cardiomyopathy (CMS/HCC)     5. Coronary artery disease involving native coronary artery of native heart without angina pectoris              Plan:       At this stage, I would recommend to continue current treatment.  Blood pressure monitoring is recommended.  I will see the patient in 6 months and pacemaker/ICD would be interrogated.  "

## 2020-11-12 ENCOUNTER — OFFICE VISIT (OUTPATIENT)
Dept: CARDIOLOGY | Facility: CLINIC | Age: 55
End: 2020-11-12

## 2020-11-12 ENCOUNTER — CLINICAL SUPPORT NO REQUIREMENTS (OUTPATIENT)
Dept: CARDIOLOGY | Facility: CLINIC | Age: 55
End: 2020-11-12

## 2020-11-12 VITALS
HEIGHT: 67 IN | DIASTOLIC BLOOD PRESSURE: 90 MMHG | BODY MASS INDEX: 32.02 KG/M2 | WEIGHT: 204 LBS | HEART RATE: 92 BPM | SYSTOLIC BLOOD PRESSURE: 150 MMHG | OXYGEN SATURATION: 99 %

## 2020-11-12 DIAGNOSIS — Z95.810 PRESENCE OF AUTOMATIC IMPLANTABLE CARDIOVERTER-DEFIBRILLATOR: ICD-10-CM

## 2020-11-12 DIAGNOSIS — I42.0 DILATED CARDIOMYOPATHY (HCC): ICD-10-CM

## 2020-11-12 DIAGNOSIS — E78.2 MIXED HYPERLIPIDEMIA: Primary | ICD-10-CM

## 2020-11-12 DIAGNOSIS — I47.9 PAROXYSMAL TACHYCARDIA (HCC): ICD-10-CM

## 2020-11-12 DIAGNOSIS — I10 ESSENTIAL HYPERTENSION: ICD-10-CM

## 2020-11-12 DIAGNOSIS — I42.0 DILATED CARDIOMYOPATHY (HCC): Primary | ICD-10-CM

## 2020-11-12 PROCEDURE — 93000 ELECTROCARDIOGRAM COMPLETE: CPT | Performed by: INTERNAL MEDICINE

## 2020-11-12 PROCEDURE — 99214 OFFICE O/P EST MOD 30 MIN: CPT | Performed by: INTERNAL MEDICINE

## 2020-11-12 PROCEDURE — 93283 PRGRMG EVAL IMPLANTABLE DFB: CPT | Performed by: NURSE PRACTITIONER

## 2020-11-12 NOTE — PROGRESS NOTES
Date of Office Visit: 2020  Encounter Provider: Dr. Ashok Fowler    Place of Service: [facility] CARDIOLOGY [place]  Patient Name: Herve Gill  :1965  Rogerio Gordillo MD    Chief Complaint   Patient presents with   • Follow-up   • Pacemaker Check     History of Present Illness    I am pleased to have video visit with Mr. Gill in my office today as a followup.    As you know, patient is 55 years old white gentleman whose past medical history is significant for hypertension, hyperlipidemia, coronary artery disease, status post coronary artery stenting, cardiomyopathy, status post AICD implantation, who had video visit.    In , patient had acute myocardial infarction when he was in [place] and cardiac catheterization showed 100% occlusion of the large OM branch of LCx. LAD was free of disease. Severe left ventricle dysfunction noted.  underwent AICD implantation. In , patient had repeat echocardiogram which showed LVEF of 35-40% and akinetic inferior wall was noted.In 2019, echocardiogram showed EF of 25 to 30%.  No significant valvular heart disease noted.    Since the previous visit, patient is doing fairly well from cardiovascular standpoint.  Patient walks for 1 mile almost every other day.  Patient denies any symptom of chest pain or tightness or heaviness.  No orthopnea PND no syncope or presyncope.  No leg edema noted.    EKG showed normal sinus rhythm.  Inferior myocardial infarction noted.    ICD is interrogated and it is functioning appropriately.    At present patient is doing fairly well.  He is slightly tachycardic but patient is not taking carvedilol 25 mg twice daily.  Instead he is taking only once a day.  I encouraged him to take twice a day.  I explained to him the reasoning.  I also advised him to find with the insurance company how much it would cost Entresto.  I would like to switch him to Entresto if he can afford this.  Patient would call  my office back.  At this stage current treatment would be continued.    Past Medical History:   Diagnosis Date   • Cardiomyopathy (CMS/HCC)    • Diabetes mellitus (CMS/HCC)    • Heart disease    • Hyperlipidemia    • Hypertension    • Tachycardia          Past Surgical History:   Procedure Laterality Date   • CARDIAC CATHETERIZATION     • CORONARY STENT PLACEMENT     • ECHO - CONVERTED  2019   • INSERT / REPLACE / REMOVE PACEMAKER     • INTERNAL CARDIAC DEFIBRILLATOR INSERTION Left            Current Outpatient Medications:   •  aspirin 81 MG EC tablet, Take 1 tablet by mouth Daily., Disp: , Rfl:   •  atorvastatin (LIPITOR) 80 MG tablet, Take 80 mg by mouth Daily., Disp: , Rfl:   •  carvedilol (COREG) 25 MG tablet, Take 25 mg by mouth 2 (Two) Times a Day With Meals., Disp: , Rfl:   •  clopidogrel (PLAVIX) 75 MG tablet, Take 1 tablet by mouth Daily., Disp: , Rfl:   •  enalapril (VASOTEC) 10 MG tablet, Take 1 tablet by mouth Daily. Hold if bp below 110/60 (Patient taking differently: Take 10 mg by mouth 2 (Two) Times a Day. Hold if bp below 110/60), Disp: , Rfl:   •  metFORMIN (GLUCOPHAGE) 500 MG tablet, Take 500 mg by mouth 2 (Two) Times a Day With Meals., Disp: , Rfl:   •  vitamin B-12 (VITAMIN B-12) 1000 MCG tablet, Take 1 tablet by mouth Daily., Disp: 30 tablet, Rfl: 1      Social History     Socioeconomic History   • Marital status:      Spouse name: Not on file   • Number of children: Not on file   • Years of education: Not on file   • Highest education level: Not on file   Tobacco Use   • Smoking status: Never Smoker   • Smokeless tobacco: Never Used   Substance and Sexual Activity   • Alcohol use: Yes     Comment: occasional    • Drug use: No   • Sexual activity: Defer         Review of Systems   Constitution: Negative for chills and fever.   HENT: Negative for ear discharge and nosebleeds.    Eyes: Negative for discharge and redness.   Cardiovascular: Negative for chest pain, orthopnea, palpitations,  "paroxysmal nocturnal dyspnea and syncope.   Respiratory: Negative for cough, shortness of breath and wheezing.    Endocrine: Negative for heat intolerance.   Skin: Negative for rash.   Musculoskeletal: Negative for arthritis and myalgias.   Gastrointestinal: Negative for abdominal pain, melena, nausea and vomiting.   Genitourinary: Negative for dysuria and hematuria.   Neurological: Negative for dizziness, light-headedness, numbness and tremors.   Psychiatric/Behavioral: Negative for depression. The patient is not nervous/anxious.        Procedures      ECG 12 Lead    Date/Time: 11/12/2020 2:42 PM  Performed by: Ashok Fowler MD  Authorized by: Ashok Fowler MD   Comparison: compared with previous ECG   Similar to previous ECG  Rhythm: sinus rhythm  Q waves: II, III and aVF    Other findings: non-specific ST-T wave changes and T wave abnormality    Clinical impression: abnormal EKG            ECG 12 Lead    (Results Pending)           Objective:    /90   Pulse 92   Ht 170.2 cm (67.01\")   Wt 92.5 kg (204 lb)   SpO2 99%   BMI 31.94 kg/m²         Constitutional:       Appearance: Well-developed.   Eyes:      General: No scleral icterus.        Right eye: No discharge.   HENT:      Head: Normocephalic and atraumatic.   Neck:      Thyroid: No thyromegaly.      Lymphadenopathy: No cervical adenopathy.   Pulmonary:      Effort: Pulmonary effort is normal. No respiratory distress.      Breath sounds: Normal breath sounds. No wheezing. No rales.   Cardiovascular:      Normal rate. Regular rhythm.      No gallop.   Abdominal:      Tenderness: There is no abdominal tenderness.   Skin:     Findings: No erythema or rash.   Neurological:      Mental Status: Alert and oriented to person, place, and time.             Assessment:       Diagnosis Plan   1. Mixed hyperlipidemia  ECG 12 Lead   2. Essential hypertension  ECG 12 Lead   3. Paroxysmal tachycardia (CMS/HCC)  ECG 12 Lead   4. Presence of automatic implantable " cardioverter-defibrillator  ECG 12 Lead   5. Dilated cardiomyopathy (CMS/HCC)  ECG 12 Lead            Plan:       At this stage, I would recommend that patient should start taking carvedilol 25 mg twice daily.  I would consider Entresto in future.  Patient is advised to check for stability and coverage for Entresto with his insurance company.  Patient ICD is functioning appropriately.

## 2020-11-18 NOTE — PROGRESS NOTES
DEVICE INTERROGATION:  IN OFFICE    DEVICE TYPE:   Single-chamber ICD    :   [employer]    BATTERY:  Stable    TIME TO ELECTIVE REPLACEMENT INDICATORS:   2.3 years    CHARGE TIME:   10.6 seconds        LEAD DATA:       DEVICE REPROGRAMMED FOR TESTING            Ventricular:     12 mV, 440 ohms, 0.5 V@0.5 ms    LV:      Atrial pacing percentage: Nonapplicable %    Ventricular pacing percentage: Less than 1%      Arrhythmia Logbook Reviewed: No VT or VF episodes        Summary:    Stable Device Function    No significant arhythmia burden.     Battery status is stable.    Reviewed with: Dr. Fowler      NEXT IN OFFICE DEVICE CHECK DUE: 6 months    REMOTE DEVICE INTERROGATIONS: 3 months

## 2021-05-12 NOTE — PROGRESS NOTES
Date of Office Visit: 2021  Encounter Provider: Dr. Ashok Fowler  Place of Service: [facility] [place]  Patient Name: Herve Gill  :1965  Rogerio Gordillo MD    Chief Complaint   Patient presents with   • Atrial Fibrillation     6 month follow up/device check   • Hypertension   • Hyperlipidemia   • Cardiomyopathy   • Coronary Artery Disease     History of Present Illness    I am pleased to have video visit with Mr. Gill in my office today as a followup.    As you know, patient is 55 years old white gentleman whose past medical history is significant for hypertension, hyperlipidemia, coronary artery disease, status post coronary artery stenting, cardiomyopathy, status post AICD implantation, who had video visit.    In , patient had acute myocardial infarction when he was in [place] and cardiac catheterization showed 100% occlusion of the large OM branch of LCx. LAD was free of disease. Severe left ventricle dysfunction noted.  underwent AICD implantation. In , patient had repeat echocardiogram which showed LVEF of 35-40% and akinetic inferior wall was noted.In 2019, echocardiogram showed EF of 25 to 30%.  No significant valvular heart disease noted.    Since the previous visit, patient is doing fairly well from cardiovascular standpoint.  Patient walks for 1 mile almost every other day.  Patient denies any symptom of chest pain or tightness or heaviness.  No orthopnea PND no syncope or presyncope.  No leg edema noted.    EKG showed normal sinus rhythm.  Inferior myocardial infarction noted.    ICD is interrogated and it is functioning appropriately.    At present patient is doing fairly well.  He is slightly tachycardic but patient is not taking carvedilol 25 mg twice daily.  Instead he is taking only once a day.  I encouraged him to take twice a day.  I explained to him the reasoning.  I also advised him to find with the insurance company how much it  would cost Entresto.  I would like to switch him to Entresto if he can afford this.  Patient would call my office back.  At this stage current treatment would be continued.    Past Medical History:   Diagnosis Date   • Cardiomyopathy (CMS/HCC)    • Coronary artery disease    • Diabetes mellitus (CMS/HCC)    • Heart disease    • Hyperlipidemia    • Hypertension    • Tachycardia          Past Surgical History:   Procedure Laterality Date   • CARDIAC CATHETERIZATION     • CORONARY STENT PLACEMENT     • ECHO - CONVERTED  2019   • INSERT / REPLACE / REMOVE PACEMAKER     • INTERNAL CARDIAC DEFIBRILLATOR INSERTION Left            Current Outpatient Medications:   •  aspirin 81 MG EC tablet, Take 1 tablet by mouth Daily., Disp: , Rfl:   •  atorvastatin (LIPITOR) 80 MG tablet, Take 80 mg by mouth Daily., Disp: , Rfl:   •  carvedilol (COREG) 25 MG tablet, Take 25 mg by mouth 2 (Two) Times a Day With Meals., Disp: , Rfl:   •  clopidogrel (PLAVIX) 75 MG tablet, Take 1 tablet by mouth Daily., Disp: , Rfl:   •  enalapril (VASOTEC) 10 MG tablet, Take 1 tablet by mouth Daily. Hold if bp below 110/60 (Patient taking differently: Take 10 mg by mouth 2 (Two) Times a Day. Hold if bp below 110/60), Disp: , Rfl:   •  metFORMIN (GLUCOPHAGE) 500 MG tablet, Take 500 mg by mouth 2 (Two) Times a Day With Meals., Disp: , Rfl:   •  vitamin B-12 (VITAMIN B-12) 1000 MCG tablet, Take 1 tablet by mouth Daily., Disp: 30 tablet, Rfl: 1      Social History     Socioeconomic History   • Marital status:      Spouse name: Not on file   • Number of children: Not on file   • Years of education: Not on file   • Highest education level: Not on file   Tobacco Use   • Smoking status: Never Smoker   • Smokeless tobacco: Never Used   Vaping Use   • Vaping Use: Never used   Substance and Sexual Activity   • Alcohol use: Yes     Comment: occasional    • Drug use: No   • Sexual activity: Defer         ROS    Procedures    Procedures    No orders to display  "          Objective:    Ht 170.2 cm (67.01\")   BMI 31.94 kg/m²         Physical Exam        Assessment:       Diagnosis Plan   1. Mixed hyperlipidemia     2. Essential hypertension     3. Paroxysmal tachycardia (CMS/HCC)     4. Presence of automatic implantable cardioverter-defibrillator     5. Dilated cardiomyopathy (CMS/HCC)     6. Coronary artery disease involving native coronary artery of native heart without angina pectoris              Plan:         "

## 2021-05-13 ENCOUNTER — OFFICE VISIT (OUTPATIENT)
Dept: CARDIOLOGY | Facility: CLINIC | Age: 56
End: 2021-05-13

## 2021-05-13 VITALS — HEIGHT: 67 IN | BODY MASS INDEX: 31.94 KG/M2

## 2021-05-13 DIAGNOSIS — Z95.810 PRESENCE OF AUTOMATIC IMPLANTABLE CARDIOVERTER-DEFIBRILLATOR: ICD-10-CM

## 2021-05-13 DIAGNOSIS — I25.10 CORONARY ARTERY DISEASE INVOLVING NATIVE CORONARY ARTERY OF NATIVE HEART WITHOUT ANGINA PECTORIS: ICD-10-CM

## 2021-05-13 DIAGNOSIS — I42.0 DILATED CARDIOMYOPATHY (HCC): ICD-10-CM

## 2021-05-13 DIAGNOSIS — I47.9 PAROXYSMAL TACHYCARDIA (HCC): ICD-10-CM

## 2021-05-13 DIAGNOSIS — I10 ESSENTIAL HYPERTENSION: ICD-10-CM

## 2021-05-13 DIAGNOSIS — E78.2 MIXED HYPERLIPIDEMIA: Primary | ICD-10-CM

## 2021-06-29 NOTE — PROGRESS NOTES
Date of Office Visit: 2021  Encounter Provider: Dr. Ashok Fowler  Place of Service: [facility] [place]  Patient Name: Herve Gill  :1965  Rogerio Gordillo MD    Chief Complaint   Patient presents with   • Coronary Artery Disease     6 month follow up/device check   • Cardiomyopathy   • Hypertension   • Hyperlipidemia     History of Present Illness    I am pleased to have video visit with Mr. Gill in my office today as a followup.    As you know, patient is 56 years old white gentleman whose past medical history is significant for hypertension, hyperlipidemia, coronary artery disease, status post coronary artery stenting, cardiomyopathy, status post AICD implantation, who had video visit.    In , patient had acute myocardial infarction when he was in [place] and cardiac catheterization showed 100% occlusion of the large OM branch of LCx. LAD was free of disease. Severe left ventricle dysfunction noted.  underwent AICD implantation. In , patient had repeat echocardiogram which showed LVEF of 35-40% and akinetic inferior wall was noted.In 2019, echocardiogram showed EF of 25 to 30%.  No significant valvular heart disease noted.    This is routine follow-up for patient.  Patient is overall doing well.  In 2020 patient contracted COVID-19 infection and recovered.  Patient did not get vaccine.  Patient denies any symptom of chest pain or tightness or heaviness.  Patient denies any significant shortness of breath.  No leg edema.  No orthopnea PND no syncope or presyncope.  Patient had no cough or expectoration    ICD is interrogated and it is functioning appropriately.  It is 12 months left on the generator.    At this stage I am overall pleased with the patient progress.  I would consider stress test on next year.  I would also consider generator change once it reached the end of life.  Continue current therapy.        Past Medical History:    Diagnosis Date   • Cardiomyopathy (CMS/HCC)    • Coronary artery disease    • Diabetes mellitus (CMS/HCC)    • Heart disease    • Hyperlipidemia    • Hypertension    • Tachycardia          Past Surgical History:   Procedure Laterality Date   • CARDIAC CATHETERIZATION     • CORONARY STENT PLACEMENT     • ECHO - CONVERTED  2019   • INSERT / REPLACE / REMOVE PACEMAKER     • INTERNAL CARDIAC DEFIBRILLATOR INSERTION Left            Current Outpatient Medications:   •  aspirin 81 MG EC tablet, Take 1 tablet by mouth Daily., Disp: , Rfl:   •  atorvastatin (LIPITOR) 80 MG tablet, Take 80 mg by mouth Daily., Disp: , Rfl:   •  carvedilol (COREG) 25 MG tablet, Take 25 mg by mouth 2 (Two) Times a Day With Meals., Disp: , Rfl:   •  clopidogrel (PLAVIX) 75 MG tablet, Take 1 tablet by mouth Daily., Disp: , Rfl:   •  enalapril (VASOTEC) 10 MG tablet, Take 1 tablet by mouth Daily. Hold if bp below 110/60 (Patient taking differently: Take 10 mg by mouth 2 (Two) Times a Day. Hold if bp below 110/60), Disp: , Rfl:   •  metFORMIN (GLUCOPHAGE) 500 MG tablet, Take 500 mg by mouth 2 (Two) Times a Day With Meals., Disp: , Rfl:   •  vitamin B-12 (VITAMIN B-12) 1000 MCG tablet, Take 1 tablet by mouth Daily., Disp: 30 tablet, Rfl: 1      Social History     Socioeconomic History   • Marital status:      Spouse name: Not on file   • Number of children: Not on file   • Years of education: Not on file   • Highest education level: Not on file   Tobacco Use   • Smoking status: Never Smoker   • Smokeless tobacco: Never Used   Vaping Use   • Vaping Use: Never used   Substance and Sexual Activity   • Alcohol use: Yes     Comment: occasional    • Drug use: No   • Sexual activity: Defer         Review of Systems   Constitutional: Negative for chills and fever.   HENT: Negative for ear discharge and nosebleeds.    Eyes: Negative for discharge and redness.   Cardiovascular: Negative for chest pain, orthopnea, palpitations, paroxysmal nocturnal  "dyspnea and syncope.   Respiratory: Positive for shortness of breath. Negative for cough and wheezing.    Endocrine: Negative for heat intolerance.   Skin: Negative for rash.   Musculoskeletal: Positive for arthritis, gout and joint pain. Negative for myalgias.   Gastrointestinal: Negative for abdominal pain, melena, nausea and vomiting.   Genitourinary: Negative for dysuria and hematuria.   Neurological: Negative for dizziness, light-headedness, numbness and tremors.   Psychiatric/Behavioral: Negative for depression. The patient is not nervous/anxious.        Procedures    Procedures    No orders to display           Objective:    /64   Pulse 68   Ht 170.2 cm (67.01\")   Wt 92.5 kg (204 lb)   BMI 31.94 kg/m²         Constitutional:       Appearance: Well-developed.   Eyes:      General: No scleral icterus.        Right eye: No discharge.   HENT:      Head: Normocephalic and atraumatic.   Neck:      Thyroid: No thyromegaly.      Lymphadenopathy: No cervical adenopathy.   Pulmonary:      Effort: Pulmonary effort is normal. No respiratory distress.      Breath sounds: Normal breath sounds. No wheezing. No rales.   Cardiovascular:      Normal rate. Regular rhythm.      No gallop.   Abdominal:      Tenderness: There is no abdominal tenderness.   Skin:     Findings: No erythema or rash.   Neurological:      Mental Status: Alert and oriented to person, place, and time.             Assessment:       Diagnosis Plan   1. Mixed hyperlipidemia     2. Essential hypertension     3. Paroxysmal tachycardia (CMS/HCC)     4. Presence of automatic implantable cardioverter-defibrillator     5. Dilated cardiomyopathy (CMS/HCC)     6. Coronary artery disease involving native coronary artery of native heart without angina pectoris              Plan:       MDM:    1.  CAD:    Patient is doing fairly well from cardiovascular standpoint.  No coronary insufficiency.  Consider stress test next visit    2.  Dilated " cardiomyopathy:    Continue carvedilol and Vasotec.  Patient has high price for Entresto.    3.  Hypertension:    Blood pressure is very well controlled    4.  Ventricular tachycardia:    Patient has not had any further episode    5.  Diabetes mellitus:    Continue exercise and diet modification    6.  Hyperlipidemia:    Patient is advised to repeat lipid panel testing through PCP

## 2021-06-30 ENCOUNTER — OFFICE VISIT (OUTPATIENT)
Dept: CARDIOLOGY | Facility: CLINIC | Age: 56
End: 2021-06-30

## 2021-06-30 ENCOUNTER — CLINICAL SUPPORT NO REQUIREMENTS (OUTPATIENT)
Dept: CARDIOLOGY | Facility: CLINIC | Age: 56
End: 2021-06-30

## 2021-06-30 VITALS
SYSTOLIC BLOOD PRESSURE: 116 MMHG | WEIGHT: 204 LBS | HEIGHT: 67 IN | BODY MASS INDEX: 32.02 KG/M2 | DIASTOLIC BLOOD PRESSURE: 64 MMHG | HEART RATE: 68 BPM

## 2021-06-30 DIAGNOSIS — I25.10 CORONARY ARTERY DISEASE INVOLVING NATIVE CORONARY ARTERY OF NATIVE HEART WITHOUT ANGINA PECTORIS: ICD-10-CM

## 2021-06-30 DIAGNOSIS — E78.2 MIXED HYPERLIPIDEMIA: Primary | ICD-10-CM

## 2021-06-30 DIAGNOSIS — I42.0 DILATED CARDIOMYOPATHY (HCC): ICD-10-CM

## 2021-06-30 DIAGNOSIS — Z95.810 PRESENCE OF AUTOMATIC IMPLANTABLE CARDIOVERTER-DEFIBRILLATOR: ICD-10-CM

## 2021-06-30 DIAGNOSIS — I10 ESSENTIAL HYPERTENSION: ICD-10-CM

## 2021-06-30 DIAGNOSIS — I47.9 PAROXYSMAL TACHYCARDIA (HCC): ICD-10-CM

## 2021-06-30 DIAGNOSIS — I42.0 DILATED CARDIOMYOPATHY (HCC): Primary | ICD-10-CM

## 2021-06-30 PROBLEM — E11.9 TYPE 2 DIABETES MELLITUS: Status: ACTIVE | Noted: 2017-08-04

## 2021-06-30 PROCEDURE — 99214 OFFICE O/P EST MOD 30 MIN: CPT | Performed by: INTERNAL MEDICINE

## 2021-06-30 PROCEDURE — 93282 PRGRMG EVAL IMPLANTABLE DFB: CPT | Performed by: NURSE PRACTITIONER

## 2021-07-16 NOTE — PROGRESS NOTES
DEVICE INTERROGATION:  IN OFFICE    DEVICE TYPE:   Single-chamber ICD    :   [employer] [facility]    BATTERY:  Stable    TIME TO ELECTIVE REPLACEMENT INDICATORS:   1.2 years    CHARGE TIME:   10.9 seconds        LEAD DATA:       DEVICE REPROGRAMMED FOR TESTING      Atrial:   Not applicable    Ventricular:     11.4 mV, 440 ohms, 0.5 V@0.5 ms    LV:      Atrial pacing percentage: Not applicable %    Ventricular pacing percentage: Less than 1%      Arrhythmia Logbook Reviewed: No VT or VF episodes        Summary:    Stable Device Function    No significant arhythmia burden.     Battery status is stable.    Reviewed with: Dr. Fowler      NEXT IN OFFICE DEVICE CHECK DUE: 6 months    REMOTE DEVICE INTERROGATIONS: Enrolling in [place]

## 2021-09-24 ENCOUNTER — TELEPHONE (OUTPATIENT)
Dept: CARDIOLOGY | Facility: CLINIC | Age: 56
End: 2021-09-24

## 2021-09-24 DIAGNOSIS — I47.29 VENTRICULAR TACHYCARDIA (PAROXYSMAL) (HCC): Primary | ICD-10-CM

## 2021-09-24 NOTE — TELEPHONE ENCOUNTER
Patient noted to have an episode of ventricular tachycardia on [email or web address].    It was treated successfully with ATP.    We will review with Dr. Fowler.    Reviewing his labs I do not see any recent lab work.  I will order a BMP and magnesium level.

## 2021-09-30 PROCEDURE — 93295 DEV INTERROG REMOTE 1/2/MLT: CPT | Performed by: NURSE PRACTITIONER

## 2021-09-30 PROCEDURE — 93296 REM INTERROG EVL PM/IDS: CPT | Performed by: NURSE PRACTITIONER

## 2021-10-04 ENCOUNTER — TELEPHONE (OUTPATIENT)
Dept: CARDIOLOGY | Facility: CLINIC | Age: 56
End: 2021-10-04

## 2021-10-05 ENCOUNTER — LAB (OUTPATIENT)
Dept: LAB | Facility: HOSPITAL | Age: 56
End: 2021-10-05

## 2021-10-05 DIAGNOSIS — I47.29 VENTRICULAR TACHYCARDIA (PAROXYSMAL) (HCC): ICD-10-CM

## 2021-10-05 LAB
ANION GAP SERPL CALCULATED.3IONS-SCNC: 9 MMOL/L (ref 5–15)
BUN SERPL-MCNC: 9 MG/DL (ref 6–20)
BUN/CREAT SERPL: 11.5 (ref 7–25)
CALCIUM SPEC-SCNC: 8.6 MG/DL (ref 8.6–10.5)
CHLORIDE SERPL-SCNC: 105 MMOL/L (ref 98–107)
CO2 SERPL-SCNC: 24 MMOL/L (ref 22–29)
CREAT SERPL-MCNC: 0.78 MG/DL (ref 0.76–1.27)
GFR SERPL CREATININE-BSD FRML MDRD: 103 ML/MIN/1.73
GLUCOSE SERPL-MCNC: 170 MG/DL (ref 65–99)
MAGNESIUM SERPL-MCNC: 1.8 MG/DL (ref 1.6–2.6)
POTASSIUM SERPL-SCNC: 4.4 MMOL/L (ref 3.5–5.2)
SODIUM SERPL-SCNC: 138 MMOL/L (ref 136–145)

## 2021-10-05 PROCEDURE — 83735 ASSAY OF MAGNESIUM: CPT

## 2021-10-05 PROCEDURE — 80048 BASIC METABOLIC PNL TOTAL CA: CPT

## 2021-10-05 PROCEDURE — 36415 COLL VENOUS BLD VENIPUNCTURE: CPT

## 2021-10-05 NOTE — PROGRESS NOTES
Date of Office Visit: 10/06/2021  Encounter Provider: Dr. Ashok Fowler  Place of Service: [facility] [place]  Patient Name: Herve Gill  :1965  Rogerio Gordillo MD    Chief Complaint   Patient presents with   • Coronary Artery Disease     3 month follow up    • Cardiomyopathy   • Hypertension   • Hyperlipidemia   • Diabetes   • Rapid Heart Rate     History of Present Illness    I am pleased to have video visit with Mr. Gill in my office today as a followup.    As you know, patient is 56 years old white gentleman whose past medical history is significant for hypertension, hyperlipidemia, coronary artery disease, status post coronary artery stenting, cardiomyopathy, status post AICD implantation, who had video visit.    In , patient had acute myocardial infarction when he was in [place] and cardiac catheterization showed 100% occlusion of the large OM branch of LCx. LAD was free of disease. Severe left ventricle dysfunction noted.  underwent AICD implantation. In , patient had repeat echocardiogram which showed LVEF of 35-40% and akinetic inferior wall was noted.In 2019, echocardiogram showed EF of 25 to 30%.  No significant valvular heart disease noted.  In 2020, patient had COVID-19 infection and recovered    This is a 6 months follow-up for the patient.  Patient is doing fairly well.  Patient denies any symptom of chest pain or tightness or heaviness.  Patient complain of erectile dysfunction.  Patient wants to use Viagra.  I advised the patient to monitor the blood pressure and if blood pressure is above 110 he can use Viagra but he has to stop ACE inhibitor Vasotec at that time.  Patient denies any orthopnea, PND, syncope or presyncope.  No leg edema noted.    EKG showed sinus rhythm.  Inferior Q waves noted.    At this stage, recent remote interrogation of ICD showed stable battery life of 1 year.  I would continue checking remotely and see the  patient in 6 months for ICD check.  Patient would need device/generator change in future.        Past Medical History:   Diagnosis Date   • Cardiomyopathy (HCC)    • Coronary artery disease    • Diabetes mellitus (HCC)    • Heart disease    • Hyperlipidemia    • Hypertension    • Tachycardia          Past Surgical History:   Procedure Laterality Date   • CARDIAC CATHETERIZATION     • CORONARY STENT PLACEMENT     • ECHO - CONVERTED  2019   • INSERT / REPLACE / REMOVE PACEMAKER     • INTERNAL CARDIAC DEFIBRILLATOR INSERTION Left            Current Outpatient Medications:   •  aspirin 81 MG EC tablet, Take 1 tablet by mouth Daily., Disp: , Rfl:   •  atorvastatin (LIPITOR) 80 MG tablet, Take 80 mg by mouth Daily., Disp: , Rfl:   •  carvedilol (COREG) 25 MG tablet, Take 25 mg by mouth 2 (Two) Times a Day With Meals., Disp: , Rfl:   •  clopidogrel (PLAVIX) 75 MG tablet, Take 1 tablet by mouth Daily., Disp: , Rfl:   •  enalapril (VASOTEC) 10 MG tablet, Take 1 tablet by mouth Daily. Hold if bp below 110/60 (Patient taking differently: Take 10 mg by mouth 2 (Two) Times a Day. Hold if bp below 110/60), Disp: , Rfl:   •  metFORMIN (GLUCOPHAGE) 500 MG tablet, Take 500 mg by mouth 2 (Two) Times a Day With Meals., Disp: , Rfl:   •  vitamin B-12 (VITAMIN B-12) 1000 MCG tablet, Take 1 tablet by mouth Daily., Disp: 30 tablet, Rfl: 1      Social History     Socioeconomic History   • Marital status:      Spouse name: Not on file   • Number of children: Not on file   • Years of education: Not on file   • Highest education level: Not on file   Tobacco Use   • Smoking status: Never Smoker   • Smokeless tobacco: Never Used   Vaping Use   • Vaping Use: Never used   Substance and Sexual Activity   • Alcohol use: Yes     Comment: occasional    • Drug use: No   • Sexual activity: Defer         Review of Systems   Constitutional: Negative for chills and fever.   HENT: Negative for ear discharge and nosebleeds.    Eyes: Negative for  "discharge and redness.   Cardiovascular: Negative for chest pain, orthopnea, palpitations, paroxysmal nocturnal dyspnea and syncope.   Respiratory: Positive for shortness of breath. Negative for cough and wheezing.    Endocrine: Negative for heat intolerance.   Skin: Negative for rash.   Musculoskeletal: Positive for arthritis and joint pain. Negative for myalgias.   Gastrointestinal: Negative for abdominal pain, melena, nausea and vomiting.   Genitourinary: Negative for dysuria and hematuria.   Neurological: Negative for dizziness, light-headedness, numbness and tremors.   Psychiatric/Behavioral: Negative for depression. The patient is not nervous/anxious.        Procedures      ECG 12 Lead    Date/Time: 10/6/2021 11:20 AM  Performed by: Ashok Fowler MD  Authorized by: Ashok Fowler MD   Comparison: compared with previous ECG   Similar to previous ECG  Rhythm: sinus rhythm  Other findings: non-specific ST-T wave changes, left atrial abnormality and poor R wave progression    Clinical impression: abnormal EKG            ECG 12 Lead    (Results Pending)           Objective:    /64   Pulse 61   Ht 170.2 cm (67.01\")   Wt 93 kg (205 lb)   BMI 32.10 kg/m²         Constitutional:       Appearance: Well-developed.   Eyes:      General: No scleral icterus.        Right eye: No discharge.   HENT:      Head: Normocephalic and atraumatic.   Neck:      Thyroid: No thyromegaly.      Lymphadenopathy: No cervical adenopathy.   Pulmonary:      Effort: Pulmonary effort is normal. No respiratory distress.      Breath sounds: Normal breath sounds. No wheezing. No rales.   Cardiovascular:      Normal rate. Regular rhythm.      No gallop.   Abdominal:      Tenderness: There is no abdominal tenderness.   Skin:     Findings: No erythema or rash.   Neurological:      Mental Status: Alert and oriented to person, place, and time.             Assessment:       Diagnosis Plan   1. Mixed hyperlipidemia  ECG 12 Lead   2. Essential " hypertension  ECG 12 Lead   3. Paroxysmal tachycardia (HCC)  ECG 12 Lead   4. Dilated cardiomyopathy (HCC)  ECG 12 Lead   5. Coronary artery disease involving native coronary artery of native heart without angina pectoris  ECG 12 Lead   6. Type 2 diabetes mellitus without complication, without long-term current use of insulin (HCC)  ECG 12 Lead            Plan:       MDM:    1.  CAD:    Patient is chest pain-free.  Continue current therapy    2.  Dilated cardiomyopathy:    Patient is on carvedilol and Vasotec.  Treatment would be continued    3.  S/p ICD:    Patient still have 12 months left.  Patient would need generator change in future    4.  Hypertension:    Blood pressure is controlled.    5.  Hyperlipidemia:    Patient is on Lipitor.    6.  Diabetes mellitus:    Patient is on Metformin.  Diet modification discussed.

## 2021-10-06 ENCOUNTER — OFFICE VISIT (OUTPATIENT)
Dept: CARDIOLOGY | Facility: CLINIC | Age: 56
End: 2021-10-06

## 2021-10-06 VITALS
WEIGHT: 205 LBS | HEIGHT: 67 IN | DIASTOLIC BLOOD PRESSURE: 64 MMHG | BODY MASS INDEX: 32.18 KG/M2 | HEART RATE: 61 BPM | SYSTOLIC BLOOD PRESSURE: 118 MMHG

## 2021-10-06 DIAGNOSIS — I10 ESSENTIAL HYPERTENSION: ICD-10-CM

## 2021-10-06 DIAGNOSIS — I25.10 CORONARY ARTERY DISEASE INVOLVING NATIVE CORONARY ARTERY OF NATIVE HEART WITHOUT ANGINA PECTORIS: ICD-10-CM

## 2021-10-06 DIAGNOSIS — E78.2 MIXED HYPERLIPIDEMIA: Primary | ICD-10-CM

## 2021-10-06 DIAGNOSIS — E11.9 TYPE 2 DIABETES MELLITUS WITHOUT COMPLICATION, WITHOUT LONG-TERM CURRENT USE OF INSULIN (HCC): ICD-10-CM

## 2021-10-06 DIAGNOSIS — I47.9 PAROXYSMAL TACHYCARDIA (HCC): ICD-10-CM

## 2021-10-06 DIAGNOSIS — I42.0 DILATED CARDIOMYOPATHY (HCC): ICD-10-CM

## 2021-10-06 PROCEDURE — 93000 ELECTROCARDIOGRAM COMPLETE: CPT | Performed by: INTERNAL MEDICINE

## 2021-10-06 PROCEDURE — 99214 OFFICE O/P EST MOD 30 MIN: CPT | Performed by: INTERNAL MEDICINE

## 2021-10-11 DIAGNOSIS — R07.9 CHEST PAIN, UNSPECIFIED TYPE: ICD-10-CM

## 2021-10-11 DIAGNOSIS — I47.29 VENTRICULAR TACHYCARDIA (PAROXYSMAL) (HCC): Primary | ICD-10-CM

## 2021-10-11 RX ORDER — AMIODARONE HYDROCHLORIDE 200 MG/1
200 TABLET ORAL DAILY
Qty: 30 TABLET | Refills: 3 | Status: SHIPPED | OUTPATIENT
Start: 2021-10-11 | End: 2022-06-30 | Stop reason: SDUPTHER

## 2021-10-11 NOTE — PROGRESS NOTES
Received remote device transmission showing an episode of ventricular tachycardia on October 10.  Reviewed this with Dr. Fowler in addition to his 2 previous episodes of nonsustained VT requiring ATP in late September.    Discussed with Dr. Fowler who would like to proceed with cardiac catheterization to rule out worsening coronary artery disease due to recurrent episodes of ventricular tachycardia x3.    Dr. Fowler would also like to refer the patient to EP for evaluation due to recurrent ventricular tachycardia.    In addition he would like to start amiodarone 200 mg p.o. once daily.    The patient will be contacted to schedule cardiac catheterization on October 26.

## 2021-10-26 ENCOUNTER — OFFICE VISIT (OUTPATIENT)
Dept: CARDIOLOGY | Facility: CLINIC | Age: 56
End: 2021-10-26

## 2021-10-26 VITALS
SYSTOLIC BLOOD PRESSURE: 141 MMHG | WEIGHT: 202 LBS | HEART RATE: 83 BPM | HEIGHT: 67 IN | OXYGEN SATURATION: 97 % | BODY MASS INDEX: 31.71 KG/M2 | DIASTOLIC BLOOD PRESSURE: 85 MMHG

## 2021-10-26 DIAGNOSIS — E78.2 MIXED HYPERLIPIDEMIA: ICD-10-CM

## 2021-10-26 DIAGNOSIS — I42.0 DILATED CARDIOMYOPATHY (HCC): ICD-10-CM

## 2021-10-26 DIAGNOSIS — I25.10 CORONARY ARTERY DISEASE INVOLVING NATIVE CORONARY ARTERY OF NATIVE HEART WITHOUT ANGINA PECTORIS: ICD-10-CM

## 2021-10-26 DIAGNOSIS — I47.20 VT (VENTRICULAR TACHYCARDIA) (HCC): Primary | ICD-10-CM

## 2021-10-26 DIAGNOSIS — Z95.810 PRESENCE OF AUTOMATIC IMPLANTABLE CARDIOVERTER-DEFIBRILLATOR: ICD-10-CM

## 2021-10-26 PROCEDURE — 99214 OFFICE O/P EST MOD 30 MIN: CPT | Performed by: INTERNAL MEDICINE

## 2021-10-26 NOTE — PROGRESS NOTES
HP      Name: Herve Gill ADMIT: (Not on file)   : 1965  PCP: Rogerio Gordillo MD    MRN: [id number] LOS: 0 days   AGE/SEX: 56 y.o. male  ROOM: Room/bed info not found     Chief Complaint   Patient presents with   • Consult     establish care       Subjective         History of present illness  Herve Gill is a 56-year-old male patient who has history of hypertension, dyslipidemia, coronary artery disease status post PCI in , at that time the patient had myocardial infarction when he was in [place] and had PCI to the large OM.  Due to low ejection fraction following myocardial infarction he underwent single-chamber ICD implantation on 2010.  Patient has never had shocks from the device however recent device interrogation revealed wide-complex tachycardia.  Patient is referred to me for further evaluation.  He was started on amiodarone and is scheduled to have a heart catheterization next week.  Again the patient never felt a shock.  Also worth mentioning that there is 1 year of battery life remaining in the ICD.      Past Medical History:   Diagnosis Date   • Cardiomyopathy (HCC)    • Coronary artery disease    • Diabetes mellitus (HCC)    • Heart disease    • Hyperlipidemia    • Hypertension    • Tachycardia      Past Surgical History:   Procedure Laterality Date   • CARDIAC CATHETERIZATION     • CORONARY STENT PLACEMENT     • ECHO - CONVERTED     • INSERT / REPLACE / REMOVE PACEMAKER     • INTERNAL CARDIAC DEFIBRILLATOR INSERTION Left      Family History   Problem Relation Age of Onset   • Heart disease Father      Social History     Tobacco Use   • Smoking status: Never Smoker   • Smokeless tobacco: Never Used   Vaping Use   • Vaping Use: Never used   Substance Use Topics   • Alcohol use: Yes     Comment: occasional    • Drug use: No     (Not in a hospital admission)    Allergies:  Patient has no known allergies.      Physical Exam  VITALS REVIEWED    General:      well  developed, in no acute distress.    Head:      normocephalic and atraumatic.    Eyes:      PERRL/EOM intact, conjunctiva and sclera clear with out nystagmus.    Neck:      no masses, thyromegaly,  trachea central with normal respiratory effort and PMI displaced laterally  Lungs:      Clear to auscultation bilaterally  Heart:       Regular rate and rhythm  Msk:      no deformity or scoliosis noted of thoracic or lumbar spine.    Pulses:      pulses normal in all 4 extremities.    Extremities:       No lower extremity edema  Neurologic:      no focal deficits.   alert oriented x3  Skin:      intact without lesions or rashes.    Psych:      alert and cooperative; normal mood and affect; normal attention span and concentration.      Result Review :                Pertinent cardiac workup    Device interrogations were reviewed.  An episode of high ventricular rate on October 10, 2021 at 5:39 PM detected as VT/VF, no therapy delivered since arrhythmia spontaneously terminated.  Another episode of VT/VF on September 22, 2021 at 2:30 PM, ATP was delivered.  I reviewed the device tracings myself.    Procedures        Assessment and Plan      Herve Gill is a 56-year-old male patient who has history of coronary artery disease status post PCI and ischemic cardiomyopathy with implantation of a single lead ICD.  Patient has never had shocks from the device however to recent tracings indicated arrhythmia.  I reviewed the tracings myself unfortunately I do not have far field signals however the device interpreted as ventricular tachycardia.  In fact both these tracings show regular cycle lengths initially but then it degenerates into more erratic rhythm.  This is concerning for VT degenerating into VF, although atrial fibrillation is also in the differential.  I would like to get a 2-week Holter monitor mainly to rule out atrial fibrillation.  Patient is already scheduled to have a heart catheterization.  For now however we  will see how he does with addition of amiodarone, especially if he has new disease that require stenting.  If however there is no revascularization performed and he continues to have more VT on device checks despite amiodarone then we will proceed with VT ablation.  I discussed the plan at length with the patient who agreed.  I will see him back in about a month.    Diagnoses and all orders for this visit:    1. VT (ventricular tachycardia) (HCC) (Primary)  -     Holter Monitor - 72 Hour Up To 15 Days; Future    2. Presence of automatic implantable cardioverter-defibrillator    3. Dilated cardiomyopathy (HCC)    4. Coronary artery disease involving native coronary artery of native heart without angina pectoris    5. Mixed hyperlipidemia           Return in about 4 weeks (around 11/23/2021).  Patient was given instructions and counseling regarding his condition or for health maintenance advice. Please see specific information pulled into the AVS if appropriate.

## 2021-11-01 RX ORDER — ENALAPRIL MALEATE 10 MG/1
10 TABLET ORAL 2 TIMES DAILY
COMMUNITY
End: 2022-06-30 | Stop reason: SDUPTHER

## 2021-11-01 RX ORDER — LANOLIN ALCOHOL/MO/W.PET/CERES
1000 CREAM (GRAM) TOPICAL DAILY PRN
COMMUNITY
End: 2022-06-30

## 2021-11-02 ENCOUNTER — HOSPITAL ENCOUNTER (OUTPATIENT)
Facility: HOSPITAL | Age: 56
Discharge: HOME | End: 2021-11-03
Attending: INTERNAL MEDICINE | Admitting: INTERNAL MEDICINE

## 2021-11-02 ENCOUNTER — LAB (OUTPATIENT)
Dept: LAB | Facility: HOSPITAL | Age: 56
End: 2021-11-02

## 2021-11-02 DIAGNOSIS — I47.29 VENTRICULAR TACHYCARDIA (PAROXYSMAL) (HCC): ICD-10-CM

## 2021-11-02 DIAGNOSIS — R07.9 CHEST PAIN, UNSPECIFIED TYPE: ICD-10-CM

## 2021-11-02 LAB
ACT BLD: 125 SECONDS (ref 89–137)
ACT BLD: 164 SECONDS (ref 89–137)
ACT BLD: 175 SECONDS (ref 89–137)
ANION GAP SERPL CALCULATED.3IONS-SCNC: 11 MMOL/L (ref 5–15)
APTT PPP: 26.2 SECONDS (ref 24–31)
BUN SERPL-MCNC: 11 MG/DL (ref 6–20)
BUN/CREAT SERPL: 11.7 (ref 7–25)
CALCIUM SPEC-SCNC: 8.5 MG/DL (ref 8.6–10.5)
CHLORIDE SERPL-SCNC: 103 MMOL/L (ref 98–107)
CO2 SERPL-SCNC: 25 MMOL/L (ref 22–29)
CREAT SERPL-MCNC: 0.94 MG/DL (ref 0.76–1.27)
DEPRECATED RDW RBC AUTO: 42.4 FL (ref 37–54)
ERYTHROCYTE [DISTWIDTH] IN BLOOD BY AUTOMATED COUNT: 13.4 % (ref 12.3–15.4)
GFR SERPL CREATININE-BSD FRML MDRD: 83 ML/MIN/1.73
GLUCOSE BLDC GLUCOMTR-MCNC: 220 MG/DL (ref 70–105)
GLUCOSE SERPL-MCNC: 225 MG/DL (ref 65–99)
HCT VFR BLD AUTO: 41.7 % (ref 37.5–51)
HGB BLD-MCNC: 14.4 G/DL (ref 13–17.7)
INR PPP: 0.96 (ref 0.93–1.1)
MCH RBC QN AUTO: 30.7 PG (ref 26.6–33)
MCHC RBC AUTO-ENTMCNC: 34.4 G/DL (ref 31.5–35.7)
MCV RBC AUTO: 89.2 FL (ref 79–97)
PLATELET # BLD AUTO: 148 10*3/MM3 (ref 140–450)
PMV BLD AUTO: 9.4 FL (ref 6–12)
POTASSIUM SERPL-SCNC: 4.6 MMOL/L (ref 3.5–5.2)
PROTHROMBIN TIME: 10.7 SECONDS (ref 9.6–11.7)
RBC # BLD AUTO: 4.68 10*6/MM3 (ref 4.14–5.8)
SARS-COV-2 RNA PNL SPEC NAA+PROBE: NOT DETECTED
SODIUM SERPL-SCNC: 139 MMOL/L (ref 136–145)
WBC # BLD AUTO: 5.2 10*3/MM3 (ref 3.4–10.8)

## 2021-11-02 PROCEDURE — C1725 CATH, TRANSLUMIN NON-LASER: HCPCS | Performed by: INTERNAL MEDICINE

## 2021-11-02 PROCEDURE — C1874 STENT, COATED/COV W/DEL SYS: HCPCS | Performed by: INTERNAL MEDICINE

## 2021-11-02 PROCEDURE — 85610 PROTHROMBIN TIME: CPT

## 2021-11-02 PROCEDURE — 25010000002 MIDAZOLAM PER 1 MG: Performed by: INTERNAL MEDICINE

## 2021-11-02 PROCEDURE — 85730 THROMBOPLASTIN TIME PARTIAL: CPT

## 2021-11-02 PROCEDURE — C1769 GUIDE WIRE: HCPCS | Performed by: INTERNAL MEDICINE

## 2021-11-02 PROCEDURE — 25010000002 EPTIFIBATIDE 20 MG/10ML SOLUTION: Performed by: INTERNAL MEDICINE

## 2021-11-02 PROCEDURE — 85347 COAGULATION TIME ACTIVATED: CPT

## 2021-11-02 PROCEDURE — 63710000001 ATORVASTATIN 40 MG TABLET: Performed by: INTERNAL MEDICINE

## 2021-11-02 PROCEDURE — C9803 HOPD COVID-19 SPEC COLLECT: HCPCS

## 2021-11-02 PROCEDURE — 99152 MOD SED SAME PHYS/QHP 5/>YRS: CPT | Performed by: INTERNAL MEDICINE

## 2021-11-02 PROCEDURE — 63710000001 CLOPIDOGREL 75 MG TABLET: Performed by: INTERNAL MEDICINE

## 2021-11-02 PROCEDURE — 87635 SARS-COV-2 COVID-19 AMP PRB: CPT

## 2021-11-02 PROCEDURE — 93458 L HRT ARTERY/VENTRICLE ANGIO: CPT | Performed by: INTERNAL MEDICINE

## 2021-11-02 PROCEDURE — A9270 NON-COVERED ITEM OR SERVICE: HCPCS | Performed by: INTERNAL MEDICINE

## 2021-11-02 PROCEDURE — C9600 PERC DRUG-EL COR STENT SING: HCPCS | Performed by: INTERNAL MEDICINE

## 2021-11-02 PROCEDURE — 99153 MOD SED SAME PHYS/QHP EA: CPT | Performed by: INTERNAL MEDICINE

## 2021-11-02 PROCEDURE — 25010000002 HEPARIN (PORCINE) PER 1000 UNITS: Performed by: INTERNAL MEDICINE

## 2021-11-02 PROCEDURE — G0378 HOSPITAL OBSERVATION PER HR: HCPCS

## 2021-11-02 PROCEDURE — C1894 INTRO/SHEATH, NON-LASER: HCPCS | Performed by: INTERNAL MEDICINE

## 2021-11-02 PROCEDURE — 36415 COLL VENOUS BLD VENIPUNCTURE: CPT

## 2021-11-02 PROCEDURE — 63710000001 CARVEDILOL 25 MG TABLET: Performed by: INTERNAL MEDICINE

## 2021-11-02 PROCEDURE — 93005 ELECTROCARDIOGRAM TRACING: CPT | Performed by: INTERNAL MEDICINE

## 2021-11-02 PROCEDURE — 82962 GLUCOSE BLOOD TEST: CPT

## 2021-11-02 PROCEDURE — 92928 PRQ TCAT PLMT NTRAC ST 1 LES: CPT | Performed by: INTERNAL MEDICINE

## 2021-11-02 PROCEDURE — 80048 BASIC METABOLIC PNL TOTAL CA: CPT

## 2021-11-02 PROCEDURE — 25010000002 FENTANYL CITRATE (PF) 100 MCG/2ML SOLUTION: Performed by: INTERNAL MEDICINE

## 2021-11-02 PROCEDURE — 85027 COMPLETE CBC AUTOMATED: CPT

## 2021-11-02 PROCEDURE — 63710000001 ENALAPRIL 5 MG TABLET: Performed by: INTERNAL MEDICINE

## 2021-11-02 PROCEDURE — C1887 CATHETER, GUIDING: HCPCS | Performed by: INTERNAL MEDICINE

## 2021-11-02 PROCEDURE — 0 IOPAMIDOL PER 1 ML: Performed by: INTERNAL MEDICINE

## 2021-11-02 DEVICE — XIENCE SKYPOINT™ EVEROLIMUS ELUTING CORONARY STENT SYSTEM 3.50 MM X 15 MM / RAPID-EXCHANGE
Type: IMPLANTABLE DEVICE | Status: FUNCTIONAL
Brand: XIENCE SKYPOINT™

## 2021-11-02 RX ORDER — MIDAZOLAM HYDROCHLORIDE 1 MG/ML
INJECTION INTRAMUSCULAR; INTRAVENOUS AS NEEDED
Status: DISCONTINUED | OUTPATIENT
Start: 2021-11-02 | End: 2021-11-02 | Stop reason: HOSPADM

## 2021-11-02 RX ORDER — CARVEDILOL 25 MG/1
25 TABLET ORAL 2 TIMES DAILY WITH MEALS
Status: DISCONTINUED | OUTPATIENT
Start: 2021-11-02 | End: 2021-11-03 | Stop reason: HOSPADM

## 2021-11-02 RX ORDER — SODIUM CHLORIDE 9 MG/ML
INJECTION, SOLUTION INTRAVENOUS CONTINUOUS PRN
Status: COMPLETED | OUTPATIENT
Start: 2021-11-02 | End: 2021-11-02

## 2021-11-02 RX ORDER — ONDANSETRON 4 MG/1
4 TABLET, FILM COATED ORAL EVERY 6 HOURS PRN
Status: DISCONTINUED | OUTPATIENT
Start: 2021-11-02 | End: 2021-11-03 | Stop reason: HOSPADM

## 2021-11-02 RX ORDER — SODIUM CHLORIDE 9 MG/ML
250 INJECTION, SOLUTION INTRAVENOUS ONCE AS NEEDED
Status: DISCONTINUED | OUTPATIENT
Start: 2021-11-02 | End: 2021-11-03 | Stop reason: HOSPADM

## 2021-11-02 RX ORDER — SODIUM CHLORIDE 9 MG/ML
30 INJECTION, SOLUTION INTRAVENOUS CONTINUOUS
Status: DISCONTINUED | OUTPATIENT
Start: 2021-11-02 | End: 2021-11-03 | Stop reason: HOSPADM

## 2021-11-02 RX ORDER — DIPHENHYDRAMINE HCL 25 MG
25 CAPSULE ORAL EVERY 6 HOURS PRN
Status: DISCONTINUED | OUTPATIENT
Start: 2021-11-02 | End: 2021-11-03 | Stop reason: HOSPADM

## 2021-11-02 RX ORDER — LIDOCAINE HYDROCHLORIDE 20 MG/ML
INJECTION, SOLUTION INFILTRATION; PERINEURAL AS NEEDED
Status: DISCONTINUED | OUTPATIENT
Start: 2021-11-02 | End: 2021-11-02 | Stop reason: HOSPADM

## 2021-11-02 RX ORDER — ATORVASTATIN CALCIUM 40 MG/1
40 TABLET, FILM COATED ORAL DAILY
Status: DISCONTINUED | OUTPATIENT
Start: 2021-11-02 | End: 2021-11-03 | Stop reason: HOSPADM

## 2021-11-02 RX ORDER — HEPARIN SODIUM 1000 [USP'U]/ML
INJECTION, SOLUTION INTRAVENOUS; SUBCUTANEOUS AS NEEDED
Status: DISCONTINUED | OUTPATIENT
Start: 2021-11-02 | End: 2021-11-02 | Stop reason: HOSPADM

## 2021-11-02 RX ORDER — ENALAPRIL MALEATE 5 MG/1
10 TABLET ORAL 2 TIMES DAILY
Status: DISCONTINUED | OUTPATIENT
Start: 2021-11-02 | End: 2021-11-03 | Stop reason: HOSPADM

## 2021-11-02 RX ORDER — FENTANYL CITRATE 50 UG/ML
INJECTION, SOLUTION INTRAMUSCULAR; INTRAVENOUS AS NEEDED
Status: DISCONTINUED | OUTPATIENT
Start: 2021-11-02 | End: 2021-11-02 | Stop reason: HOSPADM

## 2021-11-02 RX ORDER — ACETAMINOPHEN 325 MG/1
650 TABLET ORAL EVERY 4 HOURS PRN
Status: DISCONTINUED | OUTPATIENT
Start: 2021-11-02 | End: 2021-11-03 | Stop reason: HOSPADM

## 2021-11-02 RX ORDER — CLOPIDOGREL BISULFATE 75 MG/1
TABLET ORAL AS NEEDED
Status: DISCONTINUED | OUTPATIENT
Start: 2021-11-02 | End: 2021-11-02 | Stop reason: HOSPADM

## 2021-11-02 RX ORDER — AMIODARONE HYDROCHLORIDE 200 MG/1
200 TABLET ORAL DAILY
Status: DISCONTINUED | OUTPATIENT
Start: 2021-11-03 | End: 2021-11-03 | Stop reason: HOSPADM

## 2021-11-02 RX ORDER — CLOPIDOGREL BISULFATE 75 MG/1
75 TABLET ORAL DAILY
Status: DISCONTINUED | OUTPATIENT
Start: 2021-11-03 | End: 2021-11-03 | Stop reason: HOSPADM

## 2021-11-02 RX ORDER — ASPIRIN 81 MG/1
81 TABLET ORAL DAILY
Status: DISCONTINUED | OUTPATIENT
Start: 2021-11-02 | End: 2021-11-03 | Stop reason: HOSPADM

## 2021-11-02 RX ORDER — ONDANSETRON 2 MG/ML
4 INJECTION INTRAMUSCULAR; INTRAVENOUS EVERY 6 HOURS PRN
Status: DISCONTINUED | OUTPATIENT
Start: 2021-11-02 | End: 2021-11-03 | Stop reason: HOSPADM

## 2021-11-02 RX ORDER — EPTIFIBATIDE 2 MG/ML
INJECTION, SOLUTION INTRAVENOUS AS NEEDED
Status: DISCONTINUED | OUTPATIENT
Start: 2021-11-02 | End: 2021-11-02 | Stop reason: HOSPADM

## 2021-11-02 RX ADMIN — ENALAPRIL MALEATE 10 MG: 5 TABLET ORAL at 22:21

## 2021-11-02 RX ADMIN — SODIUM CHLORIDE 30 ML/HR: 9 INJECTION, SOLUTION INTRAVENOUS at 08:54

## 2021-11-02 RX ADMIN — ATORVASTATIN CALCIUM 40 MG: 40 TABLET, FILM COATED ORAL at 17:59

## 2021-11-02 RX ADMIN — CARVEDILOL 25 MG: 25 TABLET, FILM COATED ORAL at 17:59

## 2021-11-02 NOTE — PLAN OF CARE
Pt sheath is out sitting up and eating bed rest is up at 1935 will continue to monitor  Problem: Adult Inpatient Plan of Care  Goal: Absence of Hospital-Acquired Illness or Injury  Intervention: Identify and Manage Fall Risk  Recent Flowsheet Documentation  Taken 11/2/2021 1611 by Aneudy Torres, RN  Safety Promotion/Fall Prevention:   safety round/check completed   nonskid shoes/slippers when out of bed   gait belt   clutter free environment maintained  Taken 11/2/2021 1515 by Aneudy Torres, RN  Safety Promotion/Fall Prevention:   safety round/check completed   nonskid shoes/slippers when out of bed   fall prevention program maintained   clutter free environment maintained  Taken 11/2/2021 1400 by Aneudy Torres, RN  Safety Promotion/Fall Prevention:   safety round/check completed   nonskid shoes/slippers when out of bed   fall prevention program maintained   clutter free environment maintained  Taken 11/2/2021 1315 by Aneudy Torres, RN  Safety Promotion/Fall Prevention:   safety round/check completed   nonskid shoes/slippers when out of bed   fall prevention program maintained   clutter free environment maintained  Goal: Readiness for Transition of Care  Intervention: Mutually Develop Transition Plan  Recent Flowsheet Documentation  Taken 11/2/2021 1311 by Aneudy Torres, RN  Transportation Anticipated: family or friend will provide  Patient/Family Anticipated Services at Transition: none  Patient/Family Anticipates Transition to: home  Taken 11/2/2021 1310 by Aneudy Torres, RN  Equipment Currently Used at Home: none   Goal Outcome Evaluation:

## 2021-11-03 ENCOUNTER — TELEPHONE (OUTPATIENT)
Dept: CARDIAC REHAB | Facility: HOSPITAL | Age: 56
End: 2021-11-03

## 2021-11-03 VITALS
HEART RATE: 68 BPM | OXYGEN SATURATION: 98 % | DIASTOLIC BLOOD PRESSURE: 51 MMHG | HEIGHT: 67 IN | TEMPERATURE: 98.2 F | SYSTOLIC BLOOD PRESSURE: 92 MMHG | BODY MASS INDEX: 32.46 KG/M2 | WEIGHT: 206.79 LBS | RESPIRATION RATE: 16 BRPM

## 2021-11-03 LAB
ACT BLD: 197 SECONDS (ref 89–137)
ANION GAP SERPL CALCULATED.3IONS-SCNC: 15 MMOL/L (ref 5–15)
ARTICHOKE IGE QN: 114 MG/DL (ref 0–100)
BUN SERPL-MCNC: 12 MG/DL (ref 6–20)
BUN/CREAT SERPL: 14 (ref 7–25)
CALCIUM SPEC-SCNC: 8.5 MG/DL (ref 8.6–10.5)
CHLORIDE SERPL-SCNC: 99 MMOL/L (ref 98–107)
CHOLEST SERPL-MCNC: 256 MG/DL (ref 0–200)
CO2 SERPL-SCNC: 21 MMOL/L (ref 22–29)
CREAT SERPL-MCNC: 0.86 MG/DL (ref 0.76–1.27)
DEPRECATED RDW RBC AUTO: 43.3 FL (ref 37–54)
ERYTHROCYTE [DISTWIDTH] IN BLOOD BY AUTOMATED COUNT: 13.7 % (ref 12.3–15.4)
GFR SERPL CREATININE-BSD FRML MDRD: 92 ML/MIN/1.73
GLUCOSE SERPL-MCNC: 240 MG/DL (ref 65–99)
HCT VFR BLD AUTO: 41.6 % (ref 37.5–51)
HDLC SERPL-MCNC: 28 MG/DL (ref 40–60)
HGB BLD-MCNC: 14.4 G/DL (ref 13–17.7)
LDLC SERPL CALC-MCNC: ABNORMAL MG/DL
LDLC/HDLC SERPL: ABNORMAL {RATIO}
MCH RBC QN AUTO: 31.1 PG (ref 26.6–33)
MCHC RBC AUTO-ENTMCNC: 34.7 G/DL (ref 31.5–35.7)
MCV RBC AUTO: 89.6 FL (ref 79–97)
PLATELET # BLD AUTO: 170 10*3/MM3 (ref 140–450)
PMV BLD AUTO: 9.5 FL (ref 6–12)
POTASSIUM SERPL-SCNC: 4.3 MMOL/L (ref 3.5–5.2)
RBC # BLD AUTO: 4.64 10*6/MM3 (ref 4.14–5.8)
SODIUM SERPL-SCNC: 135 MMOL/L (ref 136–145)
TRIGL SERPL-MCNC: 818 MG/DL (ref 0–150)
VLDLC SERPL-MCNC: ABNORMAL MG/DL
WBC # BLD AUTO: 5.6 10*3/MM3 (ref 3.4–10.8)

## 2021-11-03 PROCEDURE — 99217 PR OBSERVATION CARE DISCHARGE MANAGEMENT: CPT | Performed by: INTERNAL MEDICINE

## 2021-11-03 PROCEDURE — G0378 HOSPITAL OBSERVATION PER HR: HCPCS

## 2021-11-03 PROCEDURE — 93005 ELECTROCARDIOGRAM TRACING: CPT | Performed by: INTERNAL MEDICINE

## 2021-11-03 PROCEDURE — 85027 COMPLETE CBC AUTOMATED: CPT | Performed by: INTERNAL MEDICINE

## 2021-11-03 PROCEDURE — 83721 ASSAY OF BLOOD LIPOPROTEIN: CPT | Performed by: INTERNAL MEDICINE

## 2021-11-03 PROCEDURE — 80061 LIPID PANEL: CPT | Performed by: INTERNAL MEDICINE

## 2021-11-03 PROCEDURE — 80048 BASIC METABOLIC PNL TOTAL CA: CPT | Performed by: INTERNAL MEDICINE

## 2021-11-03 PROCEDURE — 93010 ELECTROCARDIOGRAM REPORT: CPT | Performed by: INTERNAL MEDICINE

## 2021-11-03 NOTE — DISCHARGE INSTRUCTIONS
Post Cath Instructions          Specific Physician Instructions: ***    1) Drink plenty of fluids for the next 24 hours.  This helps to eliminate the dye used in your procedure through urination.  You may resume a normal diet; however, try to avoid foods that would cause gas or constipation.    2) Sedative medication given to you during your catheterization may decrease your judgement and reaction time for up to 24-48 hours.  Therefore:  a. DO NOT drive or operate hazardous machinery (48 hours)  b. DO NOT consume alcoholic beverages  c. DO NOT make any important/legal decisions  d. Have someone stay with you for at least 24 hours    3) To allow proper healing and prevent bleeding, the following activities are to be strictly avoided for the next 24-48 hours:  a. Excessive bending at wound site  b. Straining (anything that would tense up muscles around the affected puncture site)  c. Lifting objects greater than 5 pounds, pushing, or pulling for 5 days    For Groin Cases:  1. Refrain from sexual activity  2. Refrain from running or vigorous walking  3. No prolonged sitting or standing  4. Limit stair climbing as much as possible    4) Keep the puncture site clean and dry.  You may remove the dressing tomorrow and replace it with a band-aid for at least one additional day.  Gently clean the site with mild soap and water.  No scrubbing/rubbing and lightly pat the area dry.  Showers are acceptable; however, avoid submerging in water (tub baths, hot tubs, swimming pools, dishwater, etc…) for at least one week.  The site should be completely healed before resuming these activities to reduce the risk of infection.  Check the site often.  Watch for signs and symptoms of infection and notify your physician if any of the following occur:  a. Bleeding or an increase in swelling at the puncture site  b. Fever  c. Increased soreness around puncture site  d. Foul odor or significant drainage from the puncture site  e. Swelling,  redness, or warmth at the puncture site    **A bruise or small “pea sized” lump under the skin at the puncture site is not unusual.  This should disappear within 3-4 weeks.**  5) CONTACT YOUR PHYSICIAN OR CALL 911 IF YOU EXPERIENCE ANY OF THE FOLLOWING:  a. Increased angina (chest pain) or frequent sensations of pressure, burning, pain, or other discomfort in the chest, arm, jaws, or stomach  b. Lightheadedness, dizziness, faint feeling, sweating, or difficulty breathing  c. Odd sensation changes like numbness, tingling, coldness, or pain in the arm or leg in which the catheter was inserted  d. Limb in which the catheter was inserted becomes pale/bluish in color    IMPORTANT:  Although this occurs very rarely, if you should develop bright red or excessive bleeding, feel a “pop” inside at the insertion site, or notice a sudden increase in swelling larger than a walnut, you should call 911.  Hold continuous firm pressure to the access site until emergency personnel arrive.  It is best if someone else can do this for you.

## 2021-11-03 NOTE — DISCHARGE SUMMARY
Date of Discharge:  11/3/2021    Discharge Diagnosis:    CAD  High-grade stenosis of OM branch of LCx  S/p OM/LCx stenting  Ventricular tachycardia    Presenting Problem/History of Present Illness  Active Hospital Problems    Diagnosis  POA   • **Paroxysmal tachycardia (HCC) [I47.9]  Unknown   • Ventricular tachycardia (paroxysmal) (HCC) [I47.2]  Yes      Resolved Hospital Problems   No resolved problems to display.          Hospital Course      Patient is 56-year-old white gentleman whose past medical history significant for hypertension, hyperlipidemia, ischemic cardiomyopathy, CAD, coronary artery stenting, status post ICD, who is admitted for elective cardiac catheterization.    Patient has longstanding history of acute myocardial infarction and underwent coronary artery stenting.  Patient developed cardiomyopathy subsequently and underwent AICD.  Patient has been on ACE inhibitor and beta-blocker.  Recently patient was noted to have ventricular tachyarrhythmia recurrent on remote ICD monitoring.  However no therapy was initiated.  Patient had few ATP episodes to abort the ventricular tachyarrhythmia.  It was recommended to proceed with cardiac catheterization.    Patient denies any orthopnea, PND, syncope or presyncope.  No leg edema noted.    Patient underwent cardiac catheterization and it showed high-grade stenosis of OM branch of LCx.  Patient underwent successful stenting.  Inferior branch of this OM had previous stent but it was 100% occluded with bridging collateral and also left to left collateral.  It was attempted to cross through the lesion but it was unsuccessful..    Today right groin was soft without hematoma or bruising    Procedures Performed    Procedure(s):  Left Heart Cath  -------------------       Consults:   Consults     No orders found for last 30 day(s).          Pertinent Test Results:   cardiac graphics:    ECG:   ECG 12 Lead   Preliminary Result   HEART RATE= 57  bpm   RR Interval=  1060  ms   OH Interval= 159  ms   P Horizontal Axis= 8  deg   P Front Axis= 27  deg   QRSD Interval= 111  ms   QT Interval= 494  ms   QRS Axis= 39  deg   T Wave Axis= -20  deg   - ABNORMAL ECG -   Sinus bradycardia   Nonspecific intraventricular conduction delay   Inferior infarct, old   When compared with ECG of 02-Nov-2021 15:14:59,   No significant change   Electronically Signed By:    Date and Time of Study: 2021-11-03 06:28:51      ECG 12 Lead   Preliminary Result   HEART RATE= 60  bpm   RR Interval= 1004  ms   OH Interval= 155  ms   P Horizontal Axis= 7  deg   P Front Axis= 29  deg   QRSD Interval= 115  ms   QT Interval= 466  ms   QRS Axis= 46  deg   T Wave Axis= -64  deg   - ABNORMAL ECG -   Sinus rhythm   Nonspecific intraventricular conduction delay   Inferior infarct, age indeterminate   When compared with ECG of 07-Oct-2019 10:23:48,   No significant change   Electronically Signed By:    Date and Time of Study: 2021-11-02 15:14:59         Echocardiogram: Results for orders placed during the hospital encounter of 10/07/19    Adult Transthoracic Echo Complete W/ Cont if Necessary Per Protocol    Interpretation Summary  · Left ventricular systolic function is severely decreased.  · Estimated EF appears to be in the range of 26 - 30%.  · The following left ventricular wall segments are hypokinetic: mid anterior, apical anterior, basal anterolateral, mid anterolateral, apical lateral, basal inferolateral, mid inferolateral, apical inferior, apex hypokinetic, mid anteroseptal and basal anterior. The following left ventricular wall segments are akinetic: basal inferior, mid inferior, apical septal, basal inferoseptal and mid inferoseptal.  · Left atrial cavity size is borderline dilated.     Stress Test: Not done    Condition on Discharge: Stable    Vital Signs  Temp:  [98.2 °F (36.8 °C)] 98.2 °F (36.8 °C)  Heart Rate:  [55-75] 68  Resp:  [16] 16  BP: ()/() 92/51    Physical Exam:  Constitutional:        Appearance: Well-developed.   Eyes:      General: No scleral icterus.        Right eye: No discharge.   HENT:      Head: Normocephalic and atraumatic.   Neck:      Thyroid: No thyromegaly.      Lymphadenopathy: No cervical adenopathy.   Pulmonary:      Effort: Pulmonary effort is normal. No respiratory distress.      Breath sounds: Normal breath sounds. No wheezing. No rales.   Cardiovascular:      Normal rate. Regular rhythm.      No gallop.   Edema:     Peripheral edema absent.   Abdominal:      Tenderness: There is no abdominal tenderness.   Skin:     Findings: No erythema or rash.   Neurological:      Mental Status: Alert and oriented to person, place, and time.         Discharge Disposition  Home or Self Care    Discharge Medications     Discharge Medications      Continue These Medications      Instructions Start Date   amiodarone 200 MG tablet  Commonly known as: PACERONE   200 mg, Oral, Daily      aspirin 81 MG EC tablet   81 mg, Oral, Daily      atorvastatin 40 MG tablet  Commonly known as: LIPITOR   40 mg, Oral, Daily      carvedilol 25 MG tablet  Commonly known as: COREG   25 mg, Oral, 2 Times Daily With Meals      clopidogrel 75 MG tablet  Commonly known as: PLAVIX   75 mg, Oral, Daily      enalapril 10 MG tablet  Commonly known as: VASOTEC   10 mg, Oral, 2 Times Daily      metFORMIN 500 MG tablet  Commonly known as: GLUCOPHAGE   500 mg, Oral, 2 Times Daily With Meals      vitamin B-12 1000 MCG tablet  Commonly known as: CYANOCOBALAMIN   1,000 mcg, Oral, Daily PRN             Discharge Diet:     Activity at Discharge:     Follow-up Appointments  Future Appointments   Date Time Provider Department Center   11/10/2021  9:00 AM [facility]   12/7/2021 11:15 AM William Mcintyre MD [facility] NA   12/29/2021  1:15 PM [facility], [facility]   4/7/2022  1:00 PM Ashok Fowler MD [facility]   4/7/2022  1:00 PM [facility]  CLINIC, [facility] NA     Additional Instructions for the Follow-ups that You Need to Schedule     Discharge Follow-up with Specified Provider: Dr. Fowler; 6 Weeks   As directed      To: Dr. Fowler    Follow Up: 6 Weeks               Test Results Pending at Discharge  Pending Labs     Order Current Status    LDL Cholesterol, Direct In process           Ashok Fowler MD  11/03/21  07:38 EDT    Time: Discharge greater than 30 minutes min

## 2021-11-03 NOTE — TELEPHONE ENCOUNTER
Called pt secondary to referral [facility]. Pt went through a cardiac rehab program about 11 years ago. He said he would be interested in attending a program again, but it depends on his cost for the program. He also has an upcoming appt with an EP to discuss his heart arrhythmia. He took my telephone number. I suggested he discuss with his doctors and check his insurance and call us back to schedule if he is interested.

## 2021-11-03 NOTE — PLAN OF CARE
Goal Outcome Evaluation:  Plan of Care Reviewed With: patient        Progress: improving  Outcome Summary: Cath site clean, dry, and intact. No complaints. VSS. Will continue to monitor

## 2021-11-05 ENCOUNTER — TELEPHONE (OUTPATIENT)
Dept: CARDIOLOGY | Facility: CLINIC | Age: 56
End: 2021-11-05

## 2021-11-05 NOTE — TELEPHONE ENCOUNTER
Patient called and wanted to check with Dr. Fowler to see if he is to go ahead and see Dr. Mcintyre since Dr Fowler placed a stent on Tuesday. If you could let the patient know.

## 2021-11-08 LAB
QT INTERVAL: 466 MS
QT INTERVAL: 494 MS

## 2021-11-09 ENCOUNTER — TELEPHONE (OUTPATIENT)
Dept: CARDIOLOGY | Facility: CLINIC | Age: 56
End: 2021-11-09

## 2021-11-09 NOTE — TELEPHONE ENCOUNTER
Patient called and stated that he had stents placed after heart cath. He said prior to that he was started on Amiodarone and wants to know if he should continue taking it.

## 2021-12-30 ENCOUNTER — OFFICE VISIT (OUTPATIENT)
Dept: CARDIOLOGY | Facility: CLINIC | Age: 56
End: 2021-12-30

## 2021-12-30 ENCOUNTER — CLINICAL SUPPORT NO REQUIREMENTS (OUTPATIENT)
Dept: CARDIOLOGY | Facility: CLINIC | Age: 56
End: 2021-12-30

## 2021-12-30 VITALS
HEART RATE: 85 BPM | BODY MASS INDEX: 32.33 KG/M2 | WEIGHT: 206 LBS | DIASTOLIC BLOOD PRESSURE: 66 MMHG | HEIGHT: 67 IN | SYSTOLIC BLOOD PRESSURE: 110 MMHG

## 2021-12-30 DIAGNOSIS — I25.10 CORONARY ARTERY DISEASE INVOLVING NATIVE CORONARY ARTERY OF NATIVE HEART WITHOUT ANGINA PECTORIS: ICD-10-CM

## 2021-12-30 DIAGNOSIS — E78.2 MIXED HYPERLIPIDEMIA: Primary | ICD-10-CM

## 2021-12-30 DIAGNOSIS — I47.9 PAROXYSMAL TACHYCARDIA (HCC): ICD-10-CM

## 2021-12-30 DIAGNOSIS — I42.0 DILATED CARDIOMYOPATHY (HCC): ICD-10-CM

## 2021-12-30 DIAGNOSIS — I10 ESSENTIAL HYPERTENSION: ICD-10-CM

## 2021-12-30 DIAGNOSIS — Z95.810 PRESENCE OF AUTOMATIC IMPLANTABLE CARDIOVERTER-DEFIBRILLATOR: ICD-10-CM

## 2021-12-30 DIAGNOSIS — E11.9 TYPE 2 DIABETES MELLITUS WITHOUT COMPLICATION, WITHOUT LONG-TERM CURRENT USE OF INSULIN (HCC): ICD-10-CM

## 2021-12-30 PROCEDURE — 93296 REM INTERROG EVL PM/IDS: CPT | Performed by: NURSE PRACTITIONER

## 2021-12-30 PROCEDURE — 99214 OFFICE O/P EST MOD 30 MIN: CPT | Performed by: INTERNAL MEDICINE

## 2021-12-30 PROCEDURE — 93295 DEV INTERROG REMOTE 1/2/MLT: CPT | Performed by: NURSE PRACTITIONER

## 2021-12-30 PROCEDURE — 93282 PRGRMG EVAL IMPLANTABLE DFB: CPT | Performed by: INTERNAL MEDICINE

## 2021-12-30 NOTE — PROGRESS NOTES
Date of Office Visit: 2021  Encounter Provider: Dr. Ashok Fowler  Place of Service: [facility] [place]  Patient Name: Herve Gill  :1965  Rogerio Gordillo MD    Chief Complaint   Patient presents with   • Rapid Heart Rate     hospital follow up   • Coronary Artery Disease   • Cardiomyopathy   • Diabetes   • Hypertension   • Hyperlipidemia     History of Present Illness    I am pleased to have video visit with Mr. Gill in my office today as a followup.    As you know, patient is 56 years old white gentleman whose past medical history is significant for hypertension, hyperlipidemia, coronary artery disease, status post coronary artery stenting, cardiomyopathy, status post AICD implantation, who had video visit.    In , patient had acute myocardial infarction when he was in [place] and cardiac catheterization showed 100% occlusion of the large OM branch of LCx. LAD was free of disease. Severe left ventricle dysfunction noted.  underwent AICD implantation. In , patient had repeat echocardiogram which showed LVEF of 35-40% and akinetic inferior wall was noted.In 2019, echocardiogram showed EF of 25 to 30%.  No significant valvular heart disease noted.  In 2020, patient had COVID-19 infection and recovered.    In 2021, patient started having ventricular tachyarrhythmia.  Patient underwent cardiac catheterization and it showed high-grade stenosis of LCx.  Patient underwent successful stenting of LCx.  Previous stent in inferior branch of marginal was 100% occluded.  Patient was started on amiodarone and aspirin and Plavix.    Since the previous visit, patient is doing well.  Patient denies any symptom of chest pain or tightness or heaviness.  There is no recurrence of arrhythmia.  Patient denies any palpitation.  No shortness of breath.  No orthopnea PND    ICD is interrogated and is functioning appropriately.  Is reaching end-of-life in 8  months.  I will see the patient again in 6 months.  No arrhythmia was noted.    I am very pleased with the patient condition.  I would repeat echocardiogram to assess the left ventricle function.  Patient is advised to monitor the blood pressure.        Past Medical History:   Diagnosis Date   • Cardiomyopathy (HCC)    • Coronary artery disease    • Diabetes mellitus (HCC)    • Heart disease    • Hyperlipidemia    • Hypertension    • Tachycardia          Past Surgical History:   Procedure Laterality Date   • CARDIAC CATHETERIZATION     • CARDIAC CATHETERIZATION N/A 11/2/2021    Procedure: Left Heart Cath;  Surgeon: Ashok Fowler MD;  Location: [facility] CATH INVASIVE LOCATION;  Service: Cardiovascular;  Laterality: N/A;   • CORONARY STENT PLACEMENT     • ECHO - CONVERTED  2019   • INSERT / REPLACE / REMOVE PACEMAKER     • INTERNAL CARDIAC DEFIBRILLATOR INSERTION Left            Current Outpatient Medications:   •  amiodarone (PACERONE) 200 MG tablet, Take 1 tablet by mouth Daily., Disp: 30 tablet, Rfl: 3  •  aspirin 81 MG EC tablet, Take 1 tablet by mouth Daily., Disp: , Rfl:   •  atorvastatin (LIPITOR) 40 MG tablet, Take 40 mg by mouth Daily., Disp: , Rfl:   •  carvedilol (COREG) 25 MG tablet, Take 25 mg by mouth 2 (Two) Times a Day With Meals., Disp: , Rfl:   •  clopidogrel (PLAVIX) 75 MG tablet, Take 1 tablet by mouth Daily., Disp: , Rfl:   •  enalapril (VASOTEC) 10 MG tablet, Take 10 mg by mouth 2 (Two) Times a Day., Disp: , Rfl:   •  metFORMIN (GLUCOPHAGE) 500 MG tablet, Take 500 mg by mouth 2 (Two) Times a Day With Meals., Disp: , Rfl:   •  vitamin B-12 (CYANOCOBALAMIN) 1000 MCG tablet, Take 1,000 mcg by mouth Daily As Needed., Disp: , Rfl:       Social History     Socioeconomic History   • Marital status:    Tobacco Use   • Smoking status: Never Smoker   • Smokeless tobacco: Never Used   Vaping Use   • Vaping Use: Never used   Substance and Sexual Activity   • Alcohol use: Yes     Comment: occasional    •  "Drug use: No   • Sexual activity: Defer         Review of Systems   Constitutional: Negative for chills and fever.   HENT: Negative for ear discharge and nosebleeds.    Eyes: Negative for discharge and redness.   Cardiovascular: Negative for chest pain, orthopnea, palpitations, paroxysmal nocturnal dyspnea and syncope.   Respiratory: Positive for shortness of breath. Negative for cough and wheezing.    Endocrine: Negative for heat intolerance.   Skin: Negative for rash.   Musculoskeletal: Positive for arthritis and joint pain. Negative for myalgias.   Gastrointestinal: Negative for abdominal pain, melena, nausea and vomiting.   Genitourinary: Negative for dysuria and hematuria.   Neurological: Negative for dizziness, light-headedness, numbness and tremors.   Psychiatric/Behavioral: Negative for depression. The patient is not nervous/anxious.        Procedures    Procedures    No orders to display           Objective:    /66   Pulse 85   Ht 170.2 cm (67.01\")   Wt 93.4 kg (206 lb)   BMI 32.26 kg/m²         Constitutional:       Appearance: Well-developed.   Eyes:      General: No scleral icterus.        Right eye: No discharge.   HENT:      Head: Normocephalic and atraumatic.   Neck:      Thyroid: No thyromegaly.      Lymphadenopathy: No cervical adenopathy.   Pulmonary:      Effort: Pulmonary effort is normal. No respiratory distress.      Breath sounds: Normal breath sounds. No wheezing. No rales.   Cardiovascular:      Normal rate. Regular rhythm.      No gallop.   Abdominal:      Tenderness: There is no abdominal tenderness.   Skin:     Findings: No erythema or rash.   Neurological:      Mental Status: Alert and oriented to person, place, and time.             Assessment:       Diagnosis Plan   1. Mixed hyperlipidemia  Adult Transthoracic Echo Complete W/ Cont if Necessary Per Protocol   2. Essential hypertension  Adult Transthoracic Echo Complete W/ Cont if Necessary Per Protocol   3. Paroxysmal " tachycardia (HCC)  Adult Transthoracic Echo Complete W/ Cont if Necessary Per Protocol   4. Presence of automatic implantable cardioverter-defibrillator  Adult Transthoracic Echo Complete W/ Cont if Necessary Per Protocol   5. Dilated cardiomyopathy (HCC)  Adult Transthoracic Echo Complete W/ Cont if Necessary Per Protocol   6. Coronary artery disease involving native coronary artery of native heart without angina pectoris  Adult Transthoracic Echo Complete W/ Cont if Necessary Per Protocol   7. Type 2 diabetes mellitus without complication, without long-term current use of insulin (HCC)  Adult Transthoracic Echo Complete W/ Cont if Necessary Per Protocol            Plan:       MDM:    1.  Ventricular tachycardia:    Patient has not had any further episode of V. tach.  I will continue to observe    2.  CAD:    Patient underwent cardiac catheterization and required LCx/OM stenting.  Patient is doing well.  Continue DAPT    3.  Cardiomyopathy:    Patient is on carvedilol and enalapril.  Proceed with echocardiogram    4.  Hypertension:    Blood pressure is controlled.    5.  Hyperlipidemia:    Patient is on Lipitor.  Recent triglycerides were greater than 800.  Patient may need gemfibrozil

## 2022-01-06 ENCOUNTER — APPOINTMENT (OUTPATIENT)
Dept: CARDIOLOGY | Facility: HOSPITAL | Age: 57
End: 2022-01-06

## 2022-06-29 NOTE — PROGRESS NOTES
Date of Office Visit: 2022  Encounter Provider: Dr. Ashok Fowler  Place of Service: [facility] CARDIOLOGY [place]  Patient Name: Herve Gill  :1965  Rogerio Gordillo MD    Chief Complaint   Patient presents with   • Coronary Artery Disease   • Hypertension   • Hyperlipidemia   • Diabetes   • Follow-up     History of Present Illness    I am pleased to have video visit with Mr. Gill in my office today as a followup.    As you know, patient is 57 years old white gentleman whose past medical history is significant for hypertension, hyperlipidemia, coronary artery disease, status post coronary artery stenting, cardiomyopathy, status post AICD implantation, who had video visit.    In , patient had acute myocardial infarction when he was in [place] and cardiac catheterization showed 100% occlusion of the large OM branch of LCx. LAD was free of disease. Severe left ventricle dysfunction noted.  underwent AICD implantation. In , patient had repeat echocardiogram which showed LVEF of 35-40% and akinetic inferior wall was noted.In 2019, echocardiogram showed EF of 25 to 30%.  No significant valvular heart disease noted.  In 2020, patient had COVID-19 infection and recovered.    In 2021, patient started having ventricular tachyarrhythmia.  Patient underwent cardiac catheterization and it showed high-grade stenosis of LCx.  Patient underwent successful stenting of LCx.  Previous stent in inferior branch of marginal was 100% occluded.  Patient was started on amiodarone and aspirin and Plavix.    Patient came today for follow-up of his ICD.  Patient is overall doing well.  Patient denies any symptom of chest pain or tightness or heaviness.  No orthopnea PND no syncope or presyncope.  No leg edema noted.    ICD is interrogated and it is functioning appropriately.  3 and half months on left for end-of-life for generator.  Patient is advised to come back in 3  months for pacemaker check and patient will need generator change.  I have refilled his heart medication.  Patient is advised to take precaution to go under the sun and outside activities because he is on amiodarone.  Patient is also advised to have yearly eye examination as well as chest x-ray through PCP office.      Past Medical History:   Diagnosis Date   • Cardiomyopathy (HCC)    • Coronary artery disease    • Diabetes mellitus (HCC)    • Heart disease    • Hyperlipidemia    • Hypertension    • Tachycardia          Past Surgical History:   Procedure Laterality Date   • CARDIAC CATHETERIZATION     • CARDIAC CATHETERIZATION N/A 11/2/2021    Procedure: Left Heart Cath;  Surgeon: Ashok Fowler MD;  Location: [facility] CATH INVASIVE LOCATION;  Service: Cardiovascular;  Laterality: N/A;   • CORONARY STENT PLACEMENT     • ECHO - CONVERTED  2019   • INSERT / REPLACE / REMOVE PACEMAKER     • INTERNAL CARDIAC DEFIBRILLATOR INSERTION Left            Current Outpatient Medications:   •  aspirin 81 MG EC tablet, Take 1 tablet by mouth Daily., Disp: , Rfl:   •  carvedilol (COREG) 25 MG tablet, Take 25 mg by mouth 2 (Two) Times a Day With Meals., Disp: , Rfl:   •  metFORMIN (GLUCOPHAGE) 500 MG tablet, Take 500 mg by mouth 2 (Two) Times a Day With Meals., Disp: , Rfl:   •  amiodarone (PACERONE) 200 MG tablet, Take 1 tablet by mouth Daily., Disp: 90 tablet, Rfl: 1  •  atorvastatin (LIPITOR) 40 MG tablet, Take 1 tablet by mouth Daily., Disp: 90 tablet, Rfl: 1  •  clopidogrel (PLAVIX) 75 MG tablet, Take 1 tablet by mouth Daily., Disp: 90 tablet, Rfl: 1  •  enalapril (VASOTEC) 10 MG tablet, Take 1 tablet by mouth 2 (Two) Times a Day., Disp: 180 tablet, Rfl: 1      Social History     Socioeconomic History   • Marital status:    Tobacco Use   • Smoking status: Never Smoker   • Smokeless tobacco: Never Used   Vaping Use   • Vaping Use: Never used   Substance and Sexual Activity   • Alcohol use: Yes     Comment: occasional    •  "Drug use: No   • Sexual activity: Defer         Review of Systems   Constitutional: Negative for chills and fever.   HENT: Negative for ear discharge and nosebleeds.    Eyes: Negative for discharge and redness.   Cardiovascular: Negative for chest pain, orthopnea, palpitations, paroxysmal nocturnal dyspnea and syncope.   Respiratory: Positive for shortness of breath. Negative for cough and wheezing.    Endocrine: Negative for heat intolerance.   Skin: Negative for rash.   Musculoskeletal: Negative for arthritis and myalgias.   Gastrointestinal: Negative for abdominal pain, melena, nausea and vomiting.   Genitourinary: Negative for dysuria and hematuria.   Neurological: Negative for dizziness, light-headedness, numbness and tremors.   Psychiatric/Behavioral: Negative for depression. The patient is not nervous/anxious.        Procedures    Procedures    No orders to display           Objective:    /84 (BP Location: Left arm, Patient Position: Sitting, Cuff Size: Adult)   Pulse 87   Ht 170.2 cm (67.01\")   Wt 89.8 kg (198 lb)   SpO2 98%   BMI 31.00 kg/m²         Constitutional:       Appearance: Well-developed.   Eyes:      General: No scleral icterus.        Right eye: No discharge.   HENT:      Head: Normocephalic and atraumatic.   Neck:      Thyroid: No thyromegaly.      Lymphadenopathy: No cervical adenopathy.   Pulmonary:      Effort: Pulmonary effort is normal. No respiratory distress.      Breath sounds: Normal breath sounds. No wheezing. No rales.   Cardiovascular:      Normal rate. Regular rhythm.      No gallop.   Abdominal:      Tenderness: There is no abdominal tenderness.   Skin:     Findings: No erythema or rash.   Neurological:      Mental Status: Alert and oriented to person, place, and time.             Assessment:       Diagnosis Plan   1. Dilated cardiomyopathy (HCC)  amiodarone (PACERONE) 200 MG tablet    atorvastatin (LIPITOR) 40 MG tablet    clopidogrel (PLAVIX) 75 MG tablet    enalapril " (VASOTEC) 10 MG tablet   2. Coronary artery disease involving native coronary artery of native heart without angina pectoris  amiodarone (PACERONE) 200 MG tablet    atorvastatin (LIPITOR) 40 MG tablet    clopidogrel (PLAVIX) 75 MG tablet    enalapril (VASOTEC) 10 MG tablet   3. Essential hypertension  amiodarone (PACERONE) 200 MG tablet    atorvastatin (LIPITOR) 40 MG tablet    clopidogrel (PLAVIX) 75 MG tablet    enalapril (VASOTEC) 10 MG tablet   4. Mixed hyperlipidemia  amiodarone (PACERONE) 200 MG tablet    atorvastatin (LIPITOR) 40 MG tablet    clopidogrel (PLAVIX) 75 MG tablet    enalapril (VASOTEC) 10 MG tablet   5. Paroxysmal tachycardia (HCC)  amiodarone (PACERONE) 200 MG tablet    atorvastatin (LIPITOR) 40 MG tablet    clopidogrel (PLAVIX) 75 MG tablet    enalapril (VASOTEC) 10 MG tablet   6. Presence of automatic implantable cardioverter-defibrillator  amiodarone (PACERONE) 200 MG tablet    atorvastatin (LIPITOR) 40 MG tablet    clopidogrel (PLAVIX) 75 MG tablet    enalapril (VASOTEC) 10 MG tablet   7. Type 2 diabetes mellitus without complication, without long-term current use of insulin (HCC)  amiodarone (PACERONE) 200 MG tablet    atorvastatin (LIPITOR) 40 MG tablet    clopidogrel (PLAVIX) 75 MG tablet    enalapril (VASOTEC) 10 MG tablet            Plan:       MDM:    1.  CAD:    Patient is free of angina pectoris.  Continue aspirin and Plavix    2.  Dilated cardiomyopathy:    Patient is on enalapril and carvedilol.  Repeat echocardiogram in future.  Patient did not go for his repeat echocardiogram in December.    3.  Hypertension:    Blood pressure is very well controlled    4.  S/p ICD:    His ICD switching to the end-of-life.  Proceed with 3 months follow-up with Manisha Franco

## 2022-06-30 ENCOUNTER — OFFICE VISIT (OUTPATIENT)
Dept: CARDIOLOGY | Facility: CLINIC | Age: 57
End: 2022-06-30

## 2022-06-30 ENCOUNTER — CLINICAL SUPPORT NO REQUIREMENTS (OUTPATIENT)
Dept: CARDIOLOGY | Facility: CLINIC | Age: 57
End: 2022-06-30

## 2022-06-30 VITALS
WEIGHT: 198 LBS | HEART RATE: 87 BPM | OXYGEN SATURATION: 98 % | DIASTOLIC BLOOD PRESSURE: 84 MMHG | SYSTOLIC BLOOD PRESSURE: 122 MMHG | HEIGHT: 67 IN | BODY MASS INDEX: 31.08 KG/M2

## 2022-06-30 DIAGNOSIS — I42.0 DILATED CARDIOMYOPATHY: Primary | ICD-10-CM

## 2022-06-30 DIAGNOSIS — Z95.810 PRESENCE OF AUTOMATIC IMPLANTABLE CARDIOVERTER-DEFIBRILLATOR: ICD-10-CM

## 2022-06-30 DIAGNOSIS — I10 ESSENTIAL HYPERTENSION: ICD-10-CM

## 2022-06-30 DIAGNOSIS — I47.9 PAROXYSMAL TACHYCARDIA: ICD-10-CM

## 2022-06-30 DIAGNOSIS — E78.2 MIXED HYPERLIPIDEMIA: ICD-10-CM

## 2022-06-30 DIAGNOSIS — E11.9 TYPE 2 DIABETES MELLITUS WITHOUT COMPLICATION, WITHOUT LONG-TERM CURRENT USE OF INSULIN: ICD-10-CM

## 2022-06-30 DIAGNOSIS — I25.10 CORONARY ARTERY DISEASE INVOLVING NATIVE CORONARY ARTERY OF NATIVE HEART WITHOUT ANGINA PECTORIS: ICD-10-CM

## 2022-06-30 PROCEDURE — 93282 PRGRMG EVAL IMPLANTABLE DFB: CPT | Performed by: NURSE PRACTITIONER

## 2022-06-30 PROCEDURE — 99214 OFFICE O/P EST MOD 30 MIN: CPT | Performed by: INTERNAL MEDICINE

## 2022-06-30 RX ORDER — AMIODARONE HYDROCHLORIDE 200 MG/1
200 TABLET ORAL DAILY
Qty: 90 TABLET | Refills: 1 | Status: SHIPPED | OUTPATIENT
Start: 2022-06-30

## 2022-06-30 RX ORDER — ENALAPRIL MALEATE 10 MG/1
10 TABLET ORAL 2 TIMES DAILY
Qty: 180 TABLET | Refills: 1 | Status: SHIPPED | OUTPATIENT
Start: 2022-06-30

## 2022-06-30 RX ORDER — CLOPIDOGREL BISULFATE 75 MG/1
75 TABLET ORAL DAILY
Qty: 90 TABLET | Refills: 1
Start: 2022-06-30 | End: 2022-10-05 | Stop reason: SDUPTHER

## 2022-06-30 RX ORDER — ATORVASTATIN CALCIUM 40 MG/1
40 TABLET, FILM COATED ORAL DAILY
Qty: 90 TABLET | Refills: 1 | Status: SHIPPED | OUTPATIENT
Start: 2022-06-30 | End: 2022-11-11 | Stop reason: SDUPTHER

## 2022-07-01 NOTE — PROGRESS NOTES
DEVICE INTERROGATION:  IN OFFICE    DEVICE TYPE:   Single-chamber ICD    :   [employer]    BATTERY:  Stable    TIME TO ELECTIVE REPLACEMENT INDICATORS:   3.6 months    CHARGE TIME:   11.9 seconds        LEAD DATA:       DEVICE REPROGRAMMED FOR TESTING      Atrial:       Ventricular:     11.4 mV, 400 ohms, 0.75 V@0.5 ms    LV:      Pacemaker dependent:   No      Atrial pacing percentage: Not applicable %    Ventricular pacing percentage: Less than 1%      Arrhythmia Logbook Reviewed: No VT or VF episodes        Summary:    Stable Device Function    No significant arhythmia burden.     Battery status is stable.    Reviewed with: Dr. Fowler      NEXT IN OFFICE DEVICE CHECK DUE: 6 months    REMOTE DEVICE INTERROGATIONS: Patient is not currently active with home monitoring.  Advised to reconnect monitor due to close to MARISSA status

## 2022-09-19 ENCOUNTER — TELEPHONE (OUTPATIENT)
Dept: CARDIOLOGY | Facility: CLINIC | Age: 57
End: 2022-09-19

## 2022-09-19 PROCEDURE — 93296 REM INTERROG EVL PM/IDS: CPT | Performed by: NURSE PRACTITIONER

## 2022-09-19 PROCEDURE — 93295 DEV INTERROG REMOTE 1/2/MLT: CPT | Performed by: NURSE PRACTITIONER

## 2022-09-19 NOTE — PROGRESS NOTES
Remote device interrogation received.    Patient's device is triggered MARISSA on September 16, 2022.    Contacted pt and notified.     Message sent to Dr. Mcintyre about setting up generator replacement    Pt reports no recent chest pain or symptoms    No recurrent recent arrhythmias.

## 2022-09-19 NOTE — TELEPHONE ENCOUNTER
Pt called to report that, on Friday, he felt two vibrations in his chest from his device in quick succession. Pt was able to contact Dr. Fowler about the issue, and wanted to know if he should be set up to have a device check in office, or elsewhere. Pt noted that he is feeling fine.

## 2022-09-20 ENCOUNTER — PREP FOR SURGERY (OUTPATIENT)
Dept: OTHER | Facility: HOSPITAL | Age: 57
End: 2022-09-20

## 2022-09-20 DIAGNOSIS — Z45.010 PACEMAKER BATTERY DEPLETION: Primary | ICD-10-CM

## 2022-09-20 RX ORDER — SODIUM CHLORIDE 0.9 % (FLUSH) 0.9 %
3 SYRINGE (ML) INJECTION EVERY 12 HOURS SCHEDULED
Status: CANCELLED | OUTPATIENT
Start: 2022-09-20

## 2022-09-20 RX ORDER — SODIUM CHLORIDE 0.9 % (FLUSH) 0.9 %
3-10 SYRINGE (ML) INJECTION AS NEEDED
Status: CANCELLED | OUTPATIENT
Start: 2022-09-20

## 2022-09-21 PROBLEM — Z45.010 PACEMAKER BATTERY DEPLETION: Status: ACTIVE | Noted: 2022-09-21

## 2022-10-05 DIAGNOSIS — I25.10 CORONARY ARTERY DISEASE INVOLVING NATIVE CORONARY ARTERY OF NATIVE HEART WITHOUT ANGINA PECTORIS: ICD-10-CM

## 2022-10-05 DIAGNOSIS — I47.9 PAROXYSMAL TACHYCARDIA: ICD-10-CM

## 2022-10-05 DIAGNOSIS — E78.2 MIXED HYPERLIPIDEMIA: ICD-10-CM

## 2022-10-05 DIAGNOSIS — I42.0 DILATED CARDIOMYOPATHY: ICD-10-CM

## 2022-10-05 DIAGNOSIS — I10 ESSENTIAL HYPERTENSION: ICD-10-CM

## 2022-10-05 DIAGNOSIS — Z95.810 PRESENCE OF AUTOMATIC IMPLANTABLE CARDIOVERTER-DEFIBRILLATOR: ICD-10-CM

## 2022-10-05 DIAGNOSIS — E11.9 TYPE 2 DIABETES MELLITUS WITHOUT COMPLICATION, WITHOUT LONG-TERM CURRENT USE OF INSULIN: ICD-10-CM

## 2022-10-05 RX ORDER — CLOPIDOGREL BISULFATE 75 MG/1
75 TABLET ORAL DAILY
Qty: 90 TABLET | Refills: 2
Start: 2022-10-05

## 2022-10-08 ENCOUNTER — LAB (OUTPATIENT)
Dept: LAB | Facility: HOSPITAL | Age: 57
End: 2022-10-08

## 2022-10-08 LAB
ANION GAP SERPL CALCULATED.3IONS-SCNC: 12 MMOL/L (ref 5–15)
BUN SERPL-MCNC: 17 MG/DL (ref 6–20)
BUN/CREAT SERPL: 16.5 (ref 7–25)
CALCIUM SPEC-SCNC: 9.2 MG/DL (ref 8.6–10.5)
CHLORIDE SERPL-SCNC: 95 MMOL/L (ref 98–107)
CO2 SERPL-SCNC: 27 MMOL/L (ref 22–29)
CREAT SERPL-MCNC: 1.03 MG/DL (ref 0.76–1.27)
DEPRECATED RDW RBC AUTO: 41.6 FL (ref 37–54)
EGFRCR SERPLBLD CKD-EPI 2021: 85.3 ML/MIN/1.73
ERYTHROCYTE [DISTWIDTH] IN BLOOD BY AUTOMATED COUNT: 13.2 % (ref 12.3–15.4)
GLUCOSE SERPL-MCNC: 394 MG/DL (ref 65–99)
HCT VFR BLD AUTO: 44.8 % (ref 37.5–51)
HGB BLD-MCNC: 14.9 G/DL (ref 13–17.7)
MCH RBC QN AUTO: 30.6 PG (ref 26.6–33)
MCHC RBC AUTO-ENTMCNC: 33.3 G/DL (ref 31.5–35.7)
MCV RBC AUTO: 91.7 FL (ref 79–97)
PLATELET # BLD AUTO: 135 10*3/MM3 (ref 140–450)
PMV BLD AUTO: 10.8 FL (ref 6–12)
POTASSIUM SERPL-SCNC: 5 MMOL/L (ref 3.5–5.2)
RBC # BLD AUTO: 4.89 10*6/MM3 (ref 4.14–5.8)
SODIUM SERPL-SCNC: 134 MMOL/L (ref 136–145)
WBC NRBC COR # BLD: 4.4 10*3/MM3 (ref 3.4–10.8)

## 2022-10-08 PROCEDURE — 85027 COMPLETE CBC AUTOMATED: CPT | Performed by: INTERNAL MEDICINE

## 2022-10-08 PROCEDURE — 80048 BASIC METABOLIC PNL TOTAL CA: CPT | Performed by: INTERNAL MEDICINE

## 2022-10-08 RX ORDER — SODIUM CHLORIDE 0.9 % (FLUSH) 0.9 %
3 SYRINGE (ML) INJECTION EVERY 12 HOURS SCHEDULED
Status: DISCONTINUED | OUTPATIENT
Start: 2022-10-08 | End: 2022-10-10 | Stop reason: HOSPADM

## 2022-10-08 RX ORDER — SODIUM CHLORIDE 0.9 % (FLUSH) 0.9 %
3-10 SYRINGE (ML) INJECTION AS NEEDED
Status: DISCONTINUED | OUTPATIENT
Start: 2022-10-08 | End: 2022-10-10 | Stop reason: HOSPADM

## 2022-10-10 ENCOUNTER — ANESTHESIA EVENT (OUTPATIENT)
Dept: CARDIOLOGY | Facility: HOSPITAL | Age: 57
End: 2022-10-10

## 2022-10-10 ENCOUNTER — ANESTHESIA (OUTPATIENT)
Dept: CARDIOLOGY | Facility: HOSPITAL | Age: 57
End: 2022-10-10

## 2022-10-10 ENCOUNTER — HOSPITAL ENCOUNTER (OUTPATIENT)
Facility: HOSPITAL | Age: 57
Setting detail: HOSPITAL OUTPATIENT SURGERY
Discharge: HOME | End: 2022-10-10
Attending: INTERNAL MEDICINE | Admitting: INTERNAL MEDICINE

## 2022-10-10 VITALS
HEART RATE: 75 BPM | DIASTOLIC BLOOD PRESSURE: 80 MMHG | BODY MASS INDEX: 31.42 KG/M2 | RESPIRATION RATE: 12 BRPM | OXYGEN SATURATION: 97 % | SYSTOLIC BLOOD PRESSURE: 144 MMHG | WEIGHT: 200.18 LBS | HEIGHT: 67 IN | TEMPERATURE: 99 F

## 2022-10-10 DIAGNOSIS — Z45.010 PACEMAKER BATTERY DEPLETION: ICD-10-CM

## 2022-10-10 LAB — GLUCOSE BLDC GLUCOMTR-MCNC: 326 MG/DL (ref 70–105)

## 2022-10-10 PROCEDURE — 25010000002 PROPOFOL 10 MG/ML EMULSION: Performed by: ANESTHESIOLOGY

## 2022-10-10 PROCEDURE — S0260 H&P FOR SURGERY: HCPCS | Performed by: INTERNAL MEDICINE

## 2022-10-10 PROCEDURE — 25010000002 FENTANYL CITRATE (PF) 100 MCG/2ML SOLUTION: Performed by: ANESTHESIOLOGY

## 2022-10-10 PROCEDURE — 33262 RMVL& REPLC PULSE GEN 1 LEAD: CPT | Performed by: INTERNAL MEDICINE

## 2022-10-10 PROCEDURE — 25010000002 CEFAZOLIN PER 500 MG: Performed by: INTERNAL MEDICINE

## 2022-10-10 PROCEDURE — 82962 GLUCOSE BLOOD TEST: CPT

## 2022-10-10 PROCEDURE — C1889 IMPLANT/INSERT DEVICE, NOC: HCPCS | Performed by: INTERNAL MEDICINE

## 2022-10-10 PROCEDURE — 25010000002 MIDAZOLAM PER 1 MG: Performed by: ANESTHESIOLOGY

## 2022-10-10 PROCEDURE — C1722 AICD, SINGLE CHAMBER: HCPCS | Performed by: INTERNAL MEDICINE

## 2022-10-10 DEVICE — IMPLANTABLE DEVICE: Type: IMPLANTABLE DEVICE | Status: FUNCTIONAL

## 2022-10-10 DEVICE — ENV PM AIGISRX ANTIBAC RESORB 2.9X3.3IN LG: Type: IMPLANTABLE DEVICE | Status: FUNCTIONAL

## 2022-10-10 RX ORDER — FENTANYL CITRATE 50 UG/ML
INJECTION, SOLUTION INTRAMUSCULAR; INTRAVENOUS AS NEEDED
Status: DISCONTINUED | OUTPATIENT
Start: 2022-10-10 | End: 2022-10-10 | Stop reason: SURG

## 2022-10-10 RX ORDER — MIDAZOLAM HYDROCHLORIDE 1 MG/ML
INJECTION INTRAMUSCULAR; INTRAVENOUS AS NEEDED
Status: DISCONTINUED | OUTPATIENT
Start: 2022-10-10 | End: 2022-10-10

## 2022-10-10 RX ORDER — LIDOCAINE HYDROCHLORIDE AND EPINEPHRINE BITARTRATE 20; .01 MG/ML; MG/ML
INJECTION, SOLUTION SUBCUTANEOUS
Status: DISCONTINUED | OUTPATIENT
Start: 2022-10-10 | End: 2022-10-10 | Stop reason: HOSPADM

## 2022-10-10 RX ORDER — MIDAZOLAM HYDROCHLORIDE 1 MG/ML
INJECTION INTRAMUSCULAR; INTRAVENOUS AS NEEDED
Status: DISCONTINUED | OUTPATIENT
Start: 2022-10-10 | End: 2022-10-10 | Stop reason: SURG

## 2022-10-10 RX ORDER — CEPHALEXIN 500 MG/1
500 CAPSULE ORAL 2 TIMES DAILY
Qty: 10 CAPSULE | Refills: 0 | Status: SHIPPED | OUTPATIENT
Start: 2022-10-10 | End: 2022-10-15

## 2022-10-10 RX ORDER — SODIUM CHLORIDE 9 MG/ML
INJECTION, SOLUTION INTRAVENOUS CONTINUOUS PRN
Status: DISCONTINUED | OUTPATIENT
Start: 2022-10-10 | End: 2022-10-10 | Stop reason: SURG

## 2022-10-10 RX ADMIN — MIDAZOLAM HYDROCHLORIDE 2 MG: 1 INJECTION, SOLUTION INTRAMUSCULAR; INTRAVENOUS at 16:30

## 2022-10-10 RX ADMIN — SODIUM CHLORIDE: 0.9 INJECTION, SOLUTION INTRAVENOUS at 16:25

## 2022-10-10 RX ADMIN — FENTANYL CITRATE 50 MCG: 50 INJECTION, SOLUTION INTRAMUSCULAR; INTRAVENOUS at 16:32

## 2022-10-10 RX ADMIN — FENTANYL CITRATE 25 MCG: 50 INJECTION, SOLUTION INTRAMUSCULAR; INTRAVENOUS at 16:49

## 2022-10-10 RX ADMIN — CEFAZOLIN 2 G: 2 INJECTION, POWDER, FOR SOLUTION INTRAMUSCULAR; INTRAVENOUS at 16:29

## 2022-10-10 RX ADMIN — FENTANYL CITRATE 25 MCG: 50 INJECTION, SOLUTION INTRAMUSCULAR; INTRAVENOUS at 16:45

## 2022-10-10 RX ADMIN — PROPOFOL INJECTABLE EMULSION 125 MCG/KG/MIN: 10 INJECTION, EMULSION INTRAVENOUS at 16:28

## 2022-10-10 NOTE — ANESTHESIA PREPROCEDURE EVALUATION
Anesthesia Evaluation     Patient summary reviewed and Nursing notes reviewed   NPO Solid Status: > 8 hours  NPO Liquid Status: > 8 hours           Airway   Mallampati: I  TM distance: >3 FB  Neck ROM: full  No difficulty expected  Dental - normal exam     Pulmonary - normal exam   Cardiovascular - normal exam    ECG reviewed    (+) pacemaker ICD, hypertension, past MI , CAD, cardiac stents Drug eluting stent within the past 12 months hyperlipidemia,     ROS comment: ECHO 2019:  Interpretation Summary  Left ventricular systolic function is severely decreased.  Estimated EF appears to be in the range of 26 - 30%.  The following left ventricular wall segments are hypokinetic: mid anterior, apical anterior, basal anterolateral, mid anterolateral, apical lateral, basal inferolateral, mid inferolateral, apical inferior, apex hypokinetic, mid anteroseptal and basal anterior. The following left ventricular wall segments are akinetic: basal inferior, mid inferior, apical septal, basal inferoseptal and mid inferoseptal.  Left atrial cavity size is borderline dilated.         Neuro/Psych  GI/Hepatic/Renal/Endo    (+)   diabetes mellitus,     Musculoskeletal     Abdominal  - normal exam    Bowel sounds: normal.   Substance History      OB/GYN          Other                      Anesthesia Plan    ASA 3     MAC     intravenous induction     Anesthetic plan, risks, benefits, and alternatives have been provided, discussed and informed consent has been obtained with: patient.        CODE STATUS:

## 2022-10-10 NOTE — H&P
History of Present Illness     I am pleased to have video visit with Mr. Gill in my office today as a followup.     As you know, patient is 57 years old white gentleman whose past medical history is significant for hypertension, hyperlipidemia, coronary artery disease, status post coronary artery stenting, cardiomyopathy, status post AICD implantation, who had video visit.     In 2010, patient had acute myocardial infarction when he was in [place] and cardiac catheterization showed 100% occlusion of the large OM branch of LCx. LAD was free of disease. Severe left ventricle dysfunction noted.  underwent AICD implantation. In 2015, patient had repeat echocardiogram which showed LVEF of 35-40% and akinetic inferior wall was noted.In September 2019, echocardiogram showed EF of 25 to 30%.  No significant valvular heart disease noted.  In July 2020, patient had COVID-19 infection and recovered.     In November 2021, patient started having ventricular tachyarrhythmia.  Patient underwent cardiac catheterization and it showed high-grade stenosis of LCx.  Patient underwent successful stenting of LCx.  Previous stent in inferior branch of marginal was 100% occluded.  Patient was started on amiodarone and aspirin and Plavix.     Patient came today for follow-up of his ICD.  Patient is overall doing well.  Patient denies any symptom of chest pain or tightness or heaviness.  No orthopnea PND no syncope or presyncope.  No leg edema noted.     ICD is interrogated and it is functioning appropriately.  3 and half months on left for end-of-life for generator.  Patient is advised to come back in 3 months for pacemaker check and patient will need generator change.  I have refilled his heart medication.  Patient is advised to take precaution to go under the sun and outside activities because he is on amiodarone.  Patient is also advised to have yearly eye examination as well as chest x-ray through PCP office.        Medical  History        Past Medical History:   Diagnosis Date   • Cardiomyopathy (HCC)     • Coronary artery disease     • Diabetes mellitus (HCC)     • Heart disease     • Hyperlipidemia     • Hypertension     • Tachycardia                 Surgical History         Past Surgical History:   Procedure Laterality Date   • CARDIAC CATHETERIZATION       • CARDIAC CATHETERIZATION N/A 11/2/2021     Procedure: Left Heart Cath;  Surgeon: Ashok Fowler MD;  Location: [facility] CATH INVASIVE LOCATION;  Service: Cardiovascular;  Laterality: N/A;   • CORONARY STENT PLACEMENT       • ECHO - CONVERTED   2019   • INSERT / REPLACE / REMOVE PACEMAKER       • INTERNAL CARDIAC DEFIBRILLATOR INSERTION Left                    Current Outpatient Medications:   •  aspirin 81 MG EC tablet, Take 1 tablet by mouth Daily., Disp: , Rfl:   •  carvedilol (COREG) 25 MG tablet, Take 25 mg by mouth 2 (Two) Times a Day With Meals., Disp: , Rfl:   •  metFORMIN (GLUCOPHAGE) 500 MG tablet, Take 500 mg by mouth 2 (Two) Times a Day With Meals., Disp: , Rfl:   •  amiodarone (PACERONE) 200 MG tablet, Take 1 tablet by mouth Daily., Disp: 90 tablet, Rfl: 1  •  atorvastatin (LIPITOR) 40 MG tablet, Take 1 tablet by mouth Daily., Disp: 90 tablet, Rfl: 1  •  clopidogrel (PLAVIX) 75 MG tablet, Take 1 tablet by mouth Daily., Disp: 90 tablet, Rfl: 1  •  enalapril (VASOTEC) 10 MG tablet, Take 1 tablet by mouth 2 (Two) Times a Day., Disp: 180 tablet, Rfl: 1        Social History   Social History            Socioeconomic History   • Marital status:    Tobacco Use   • Smoking status: Never Smoker   • Smokeless tobacco: Never Used   Vaping Use   • Vaping Use: Never used   Substance and Sexual Activity   • Alcohol use: Yes       Comment: occasional    • Drug use: No   • Sexual activity: Defer               Review of Systems   Constitutional: Negative for chills and fever.   HENT: Negative for ear discharge and nosebleeds.    Eyes: Negative for discharge and redness.  "  Cardiovascular: Negative for chest pain, orthopnea, palpitations, paroxysmal nocturnal dyspnea and syncope.   Respiratory: Positive for shortness of breath. Negative for cough and wheezing.    Endocrine: Negative for heat intolerance.   Skin: Negative for rash.   Musculoskeletal: Negative for arthritis and myalgias.   Gastrointestinal: Negative for abdominal pain, melena, nausea and vomiting.   Genitourinary: Negative for dysuria and hematuria.   Neurological: Negative for dizziness, light-headedness, numbness and tremors.   Psychiatric/Behavioral: Negative for depression. The patient is not nervous/anxious.          Procedures     Procedures     No orders to display             Objective:    /84 (BP Location: Left arm, Patient Position: Sitting, Cuff Size: Adult)   Pulse 87   Ht 170.2 cm (67.01\")   Wt 89.8 kg (198 lb)   SpO2 98%   BMI 31.00 kg/m²            Constitutional:       Appearance: Well-developed.   Eyes:      General: No scleral icterus.        Right eye: No discharge.   HENT:      Head: Normocephalic and atraumatic.   Neck:      Thyroid: No thyromegaly.      Lymphadenopathy: No cervical adenopathy.   Pulmonary:      Effort: Pulmonary effort is normal. No respiratory distress.      Breath sounds: Normal breath sounds. No wheezing. No rales.   Cardiovascular:      Normal rate. Regular rhythm.      No gallop.   Abdominal:      Tenderness: There is no abdominal tenderness.   Skin:     Findings: No erythema or rash.   Neurological:      Mental Status: Alert and oriented to person, place, and time.              Assessment:        Diagnosis Plan   1. Dilated cardiomyopathy (HCC)  amiodarone (PACERONE) 200 MG tablet     atorvastatin (LIPITOR) 40 MG tablet     clopidogrel (PLAVIX) 75 MG tablet     enalapril (VASOTEC) 10 MG tablet   2. Coronary artery disease involving native coronary artery of native heart without angina pectoris  amiodarone (PACERONE) 200 MG tablet     atorvastatin (LIPITOR) 40 MG " tablet     clopidogrel (PLAVIX) 75 MG tablet     enalapril (VASOTEC) 10 MG tablet   3. Essential hypertension  amiodarone (PACERONE) 200 MG tablet     atorvastatin (LIPITOR) 40 MG tablet     clopidogrel (PLAVIX) 75 MG tablet     enalapril (VASOTEC) 10 MG tablet   4. Mixed hyperlipidemia  amiodarone (PACERONE) 200 MG tablet     atorvastatin (LIPITOR) 40 MG tablet     clopidogrel (PLAVIX) 75 MG tablet     enalapril (VASOTEC) 10 MG tablet   5. Paroxysmal tachycardia (HCC)  amiodarone (PACERONE) 200 MG tablet     atorvastatin (LIPITOR) 40 MG tablet     clopidogrel (PLAVIX) 75 MG tablet     enalapril (VASOTEC) 10 MG tablet   6. Presence of automatic implantable cardioverter-defibrillator  amiodarone (PACERONE) 200 MG tablet     atorvastatin (LIPITOR) 40 MG tablet     clopidogrel (PLAVIX) 75 MG tablet     enalapril (VASOTEC) 10 MG tablet   7. Type 2 diabetes mellitus without complication, without long-term current use of insulin (HCC)  amiodarone (PACERONE) 200 MG tablet     atorvastatin (LIPITOR) 40 MG tablet     clopidogrel (PLAVIX) 75 MG tablet     enalapril (VASOTEC) 10 MG tablet               Plan:       MDM:     1.  CAD:     Patient is free of angina pectoris.  Continue aspirin and Plavix     2.  Dilated cardiomyopathy:     Patient is on enalapril and carvedilol.  Repeat echocardiogram in future.  Patient did not go for his repeat echocardiogram in December.     3.  Hypertension:     Blood pressure is very well controlled     4.  S/p ICD:     His ICD switching to the end-of-life.  Proceed with 3 months follow-up with Manisha Franco      Addendum:     has a single-chamber, dual coil ICD [employer], MARISSA was triggered in September 2022.  He is not pacemaker dependent.  He is here for generator change out.

## 2022-10-10 NOTE — DISCHARGE INSTRUCTIONS
Follow up wound check Dr Mcintyre office in 2 weeks. [phone]    Pacemaker DC Instructions    After Procedure:    Avoid shoulder motion for the first twenty-four (24) hours.  It takes about 3 weeks for the pacemaker leads to anchor in the heart in order to maintain good placement.  Therefore; DO NOT:    Raise your arm on the operative side above your head for 2 weeks  Perform strenuous upper body work using the arm on the operative side for 2 weeks.    Wear arm sling, if ordered by physician, to help decrease the use of arm on operative side.    After Discharge:    Carry your pacemaker identification card in your wallet or purse  We recommend you wear a MEDIC-ALERT bracelet or necklace to alert medical personnel  A topical skin adhesive may be used to close the incision.  DO NOT rub, scratch, or pick at the wound.  Keep wound dry.  Avoid topical ointments.  Protect the wound from prolonged exposure to sunlight.  If steri strips are used, they should be left in place until seen by physician.  No driving until after your follow-up appointment with the Cardiologist.     **Please be sure to read the pacemaker booklet given to you at time of discharge**

## 2022-10-10 NOTE — ANESTHESIA POSTPROCEDURE EVALUATION
Patient: Herve Gill    Procedure Summary     Date: 10/10/22 Room / Location: [facility] CATH LAB 3 / [facility] CATH INVASIVE LOCATION    Anesthesia Start: 1619 Anesthesia Stop: 1724    Procedure: ICD implant-gen change [employer] aware Diagnosis:       Pacemaker battery depletion      (NYHA class II CHF despite revascularization and optimal medical therapy and EF < 35%. Battery MARISSA)    Providers: William Mcintyre MD Provider: Howard Choe MD    Anesthesia Type: MAC ASA Status: 3          Anesthesia Type: MAC    Vitals  Vitals Value Taken Time   /84 10/10/22 1746   Temp     Pulse 71 10/10/22 1750   Resp 12 10/10/22 1725   SpO2 99 % 10/10/22 1750   Vitals shown include unvalidated device data.        Post Anesthesia Care and Evaluation    Patient participation: complete - patient participated  Level of consciousness: awake  Pain scale: See nurse's notes for pain score.  Pain management: adequate    Airway patency: patent  Anesthetic complications: No anesthetic complications  PONV Status: none  Cardiovascular status: acceptable  Respiratory status: acceptable  Hydration status: acceptable    Comments: Patient seen and examined postoperatively; vital signs stable; SpO2 greater than or equal to 90%; cardiopulmonary status stable; nausea/vomiting adequately controlled; pain adequately controlled; no apparent anesthesia complications; patient discharged from anesthesia care when discharge criteria were met

## 2022-11-11 ENCOUNTER — TELEPHONE (OUTPATIENT)
Dept: CARDIOLOGY | Facility: CLINIC | Age: 57
End: 2022-11-11

## 2022-11-11 DIAGNOSIS — I25.10 CORONARY ARTERY DISEASE INVOLVING NATIVE CORONARY ARTERY OF NATIVE HEART WITHOUT ANGINA PECTORIS: ICD-10-CM

## 2022-11-11 DIAGNOSIS — E78.2 MIXED HYPERLIPIDEMIA: ICD-10-CM

## 2022-11-11 DIAGNOSIS — I42.0 DILATED CARDIOMYOPATHY: ICD-10-CM

## 2022-11-11 DIAGNOSIS — I10 ESSENTIAL HYPERTENSION: ICD-10-CM

## 2022-11-11 DIAGNOSIS — I47.9 PAROXYSMAL TACHYCARDIA: ICD-10-CM

## 2022-11-11 DIAGNOSIS — E11.9 TYPE 2 DIABETES MELLITUS WITHOUT COMPLICATION, WITHOUT LONG-TERM CURRENT USE OF INSULIN: ICD-10-CM

## 2022-11-11 DIAGNOSIS — Z95.810 PRESENCE OF AUTOMATIC IMPLANTABLE CARDIOVERTER-DEFIBRILLATOR: ICD-10-CM

## 2022-11-11 RX ORDER — ATORVASTATIN CALCIUM 40 MG/1
40 TABLET, FILM COATED ORAL DAILY
Qty: 90 TABLET | Refills: 1 | Status: SHIPPED | OUTPATIENT
Start: 2022-11-11

## 2022-11-11 NOTE — TELEPHONE ENCOUNTER
Yes patient needs to be on this medication   and I sent in to the pharmacy hub can release this information

## 2022-12-19 PROCEDURE — 93296 REM INTERROG EVL PM/IDS: CPT | Performed by: NURSE PRACTITIONER

## 2022-12-19 PROCEDURE — 93295 DEV INTERROG REMOTE 1/2/MLT: CPT | Performed by: NURSE PRACTITIONER

## 2023-03-20 PROCEDURE — 93296 REM INTERROG EVL PM/IDS: CPT | Performed by: NURSE PRACTITIONER

## 2023-03-20 PROCEDURE — 93295 DEV INTERROG REMOTE 1/2/MLT: CPT | Performed by: NURSE PRACTITIONER

## 2023-05-18 NOTE — TELEPHONE ENCOUNTER
Per Dr Fowler- he does not need to see Dr Bauer at this time. Patient informed.    Oxybutynin Counseling:  I discussed with the patient the risks of oxybutynin including but not limited to skin rash, drowsiness, dry mouth, difficulty urinating, and blurred vision.

## 2023-09-27 ENCOUNTER — TELEPHONE (OUTPATIENT)
Dept: CARDIOLOGY | Facility: CLINIC | Age: 58
End: 2023-09-27
Payer: MEDICARE

## 2023-09-27 NOTE — TELEPHONE ENCOUNTER
Called and LVM for the patient  ----------------------------------------    OK FOR THE HUB TO RELEASE    Calling the patient to [facility] home monitoring device for pacemaker. We have not received a transmission since January 2023. Please check all connections on monitor and push the button to transmit. Please contact the office if you have any questions or you can contact [employer] at [phone]

## 2023-12-22 ENCOUNTER — HOSPITAL ENCOUNTER (EMERGENCY)
Facility: HOSPITAL | Age: 58
Discharge: HOME | End: 2023-12-23
Attending: EMERGENCY MEDICINE
Payer: MEDICARE

## 2023-12-22 ENCOUNTER — APPOINTMENT (OUTPATIENT)
Dept: GENERAL RADIOLOGY | Facility: HOSPITAL | Age: 58
End: 2023-12-22
Payer: MEDICARE

## 2023-12-22 DIAGNOSIS — Z45.02 ENCOUNTER FOR CHECKING OF AUTOMATIC IMPLANTABLE CARDIOVERTER-DEFIBRILLATOR (AICD): Primary | ICD-10-CM

## 2023-12-22 LAB
ALBUMIN SERPL-MCNC: 4.6 G/DL (ref 3.5–5.2)
ALBUMIN/GLOB SERPL: 2.1 G/DL
ALP SERPL-CCNC: 75 U/L (ref 39–117)
ALT SERPL W P-5'-P-CCNC: 33 U/L (ref 1–41)
ANION GAP SERPL CALCULATED.3IONS-SCNC: 14 MMOL/L (ref 5–15)
AST SERPL-CCNC: 24 U/L (ref 1–40)
BASOPHILS # BLD AUTO: 0.02 10*3/MM3 (ref 0–0.2)
BASOPHILS NFR BLD AUTO: 0.4 % (ref 0–1.5)
BILIRUB SERPL-MCNC: 0.3 MG/DL (ref 0–1.2)
BUN SERPL-MCNC: 14 MG/DL (ref 6–20)
BUN/CREAT SERPL: 12.3 (ref 7–25)
CALCIUM SPEC-SCNC: 9.1 MG/DL (ref 8.6–10.5)
CHLORIDE SERPL-SCNC: 99 MMOL/L (ref 98–107)
CO2 SERPL-SCNC: 24 MMOL/L (ref 22–29)
CREAT SERPL-MCNC: 1.14 MG/DL (ref 0.76–1.27)
DEPRECATED RDW RBC AUTO: 43.7 FL (ref 37–54)
EGFRCR SERPLBLD CKD-EPI 2021: 75 ML/MIN/1.73
EOSINOPHIL # BLD AUTO: 0.08 10*3/MM3 (ref 0–0.4)
EOSINOPHIL NFR BLD AUTO: 1.5 % (ref 0.3–6.2)
ERYTHROCYTE [DISTWIDTH] IN BLOOD BY AUTOMATED COUNT: 13.2 % (ref 12.3–15.4)
GEN 5 2HR TROPONIN T REFLEX: 15 NG/L
GLOBULIN UR ELPH-MCNC: 2.2 GM/DL
GLUCOSE SERPL-MCNC: 334 MG/DL (ref 65–99)
HCT VFR BLD AUTO: 46.9 % (ref 37.5–51)
HGB BLD-MCNC: 15.8 G/DL (ref 13–17.7)
HOLD SPECIMEN: NORMAL
HOLD SPECIMEN: NORMAL
IMM GRANULOCYTES # BLD AUTO: 0 10*3/MM3 (ref 0–0.05)
IMM GRANULOCYTES NFR BLD AUTO: 0 % (ref 0–0.5)
INR PPP: 0.94 (ref 0.9–1.1)
LYMPHOCYTES # BLD AUTO: 2.14 10*3/MM3 (ref 0.7–3.1)
LYMPHOCYTES NFR BLD AUTO: 40.3 % (ref 19.6–45.3)
MAGNESIUM SERPL-MCNC: 1.5 MG/DL (ref 1.6–2.6)
MCH RBC QN AUTO: 30.6 PG (ref 26.6–33)
MCHC RBC AUTO-ENTMCNC: 33.7 G/DL (ref 31.5–35.7)
MCV RBC AUTO: 90.9 FL (ref 79–97)
MONOCYTES # BLD AUTO: 0.38 10*3/MM3 (ref 0.1–0.9)
MONOCYTES NFR BLD AUTO: 7.2 % (ref 5–12)
NEUTROPHILS NFR BLD AUTO: 2.69 10*3/MM3 (ref 1.7–7)
NEUTROPHILS NFR BLD AUTO: 50.6 % (ref 42.7–76)
NRBC BLD AUTO-RTO: 0 /100 WBC (ref 0–0.2)
NT-PROBNP SERPL-MCNC: <36 PG/ML (ref 0–900)
PLATELET # BLD AUTO: 181 10*3/MM3 (ref 140–450)
PMV BLD AUTO: 10.9 FL (ref 6–12)
POTASSIUM SERPL-SCNC: 3.9 MMOL/L (ref 3.5–5.2)
PROT SERPL-MCNC: 6.8 G/DL (ref 6–8.5)
PROTHROMBIN TIME: 12.7 SECONDS (ref 11.7–14.2)
RBC # BLD AUTO: 5.16 10*6/MM3 (ref 4.14–5.8)
SODIUM SERPL-SCNC: 137 MMOL/L (ref 136–145)
TROPONIN T DELTA: 1 NG/L
TROPONIN T SERPL HS-MCNC: 14 NG/L
WBC NRBC COR # BLD AUTO: 5.31 10*3/MM3 (ref 3.4–10.8)
WHOLE BLOOD HOLD COAG: NORMAL
WHOLE BLOOD HOLD SPECIMEN: NORMAL

## 2023-12-22 PROCEDURE — 85610 PROTHROMBIN TIME: CPT | Performed by: EMERGENCY MEDICINE

## 2023-12-22 PROCEDURE — 71045 X-RAY EXAM CHEST 1 VIEW: CPT

## 2023-12-22 PROCEDURE — 80053 COMPREHEN METABOLIC PANEL: CPT | Performed by: EMERGENCY MEDICINE

## 2023-12-22 PROCEDURE — 99284 EMERGENCY DEPT VISIT MOD MDM: CPT

## 2023-12-22 PROCEDURE — 36415 COLL VENOUS BLD VENIPUNCTURE: CPT

## 2023-12-22 PROCEDURE — 93005 ELECTROCARDIOGRAM TRACING: CPT | Performed by: EMERGENCY MEDICINE

## 2023-12-22 PROCEDURE — 83880 ASSAY OF NATRIURETIC PEPTIDE: CPT | Performed by: EMERGENCY MEDICINE

## 2023-12-22 PROCEDURE — 83735 ASSAY OF MAGNESIUM: CPT | Performed by: EMERGENCY MEDICINE

## 2023-12-22 PROCEDURE — 84484 ASSAY OF TROPONIN QUANT: CPT | Performed by: EMERGENCY MEDICINE

## 2023-12-22 PROCEDURE — 93005 ELECTROCARDIOGRAM TRACING: CPT

## 2023-12-22 PROCEDURE — 85025 COMPLETE CBC W/AUTO DIFF WBC: CPT | Performed by: EMERGENCY MEDICINE

## 2023-12-22 RX ORDER — SODIUM CHLORIDE 0.9 % (FLUSH) 0.9 %
10 SYRINGE (ML) INJECTION AS NEEDED
Status: DISCONTINUED | OUTPATIENT
Start: 2023-12-22 | End: 2023-12-23 | Stop reason: HOSPADM

## 2023-12-22 RX ORDER — ASPIRIN 325 MG
325 TABLET ORAL ONCE
Status: COMPLETED | OUTPATIENT
Start: 2023-12-22 | End: 2023-12-22

## 2023-12-22 RX ADMIN — ASPIRIN 325 MG: 325 TABLET ORAL at 20:39

## 2023-12-23 VITALS
HEART RATE: 89 BPM | WEIGHT: 200 LBS | HEIGHT: 67 IN | BODY MASS INDEX: 31.39 KG/M2 | OXYGEN SATURATION: 96 % | DIASTOLIC BLOOD PRESSURE: 97 MMHG | TEMPERATURE: 98.5 F | RESPIRATION RATE: 18 BRPM | SYSTOLIC BLOOD PRESSURE: 129 MMHG

## 2023-12-23 NOTE — ED PROVIDER NOTES
" EMERGENCY DEPARTMENT ENCOUNTER    Room Number:  10/10  PCP: Provider, No Known  Patient Care Team:  Provider, No Known as PCP - William Lauren MD as Consulting Physician (Cardiology)   Independent Historians: Patient    HPI:  Chief Complaint: Defibrillator \"buzz\"    A complete HPI/ROS/PMH/PSH/SH/FH are unobtainable due to: None    Chronic or social conditions impacting patient care (Social Determinants of Health): None  (Financial Resource Strain / Food Insecurity / Transportation Needs / Physical Activity / Stress / Social Connections / Intimate Partner Violence / Housing Stability)    Context: Herve Gill is a 58 y.o. male with history of CAD with pacemaker and AICD placement who presents to the ED c/o acute episode of device \"buzzing\" just prior to arrival.  Patient was at a [employer] function feeling well and had no chest pain or palpitations twice but had a sensation that his device was buzzing and vibrating for a few seconds.  Initially he thought it was a phone.  A few minutes later he had same thing occur.  Last time he had the sensation the battery life and his device was low.  However, that he knows of battery life and function of his device should be normal.  Patient got no alerts on his Bluetooth connection with the device.  Patient feels well but became worried after 2 tones.    Review of prior external notes (non-ED) -and- Review of prior external test results outside of this encounter: I reviewed notes from patient's procedure on October 10, 2022 where patient had device replacement at that time due to battery life issues.  Patient was noted to have an EF of less than 35%    Prescription drug monitoring program review:         PAST MEDICAL HISTORY  Active Ambulatory Problems     Diagnosis Date Noted    Dizziness 10/07/2019    Mixed hyperlipidemia 10/16/2019    Essential hypertension 10/16/2019    Paroxysmal tachycardia 10/31/2012    Presence of automatic implantable " cardioverter-defibrillator 10/31/2012    Cardiomyopathy 10/31/2012    Coronary artery disease involving native coronary artery of native heart without angina pectoris 10/21/2019    Type 2 diabetes mellitus 08/04/2017    Ventricular tachycardia (paroxysmal) 11/02/2021    Pacemaker battery depletion 09/21/2022     Resolved Ambulatory Problems     Diagnosis Date Noted    No Resolved Ambulatory Problems     Past Medical History:   Diagnosis Date    Coronary artery disease     Diabetes mellitus     Heart disease     Hyperlipidemia     Hypertension     Myocardial infarct     Tachycardia          PAST SURGICAL HISTORY  Past Surgical History:   Procedure Laterality Date    CARDIAC CATHETERIZATION      CARDIAC CATHETERIZATION N/A 11/02/2021    Procedure: Left Heart Cath;  Surgeon: Ashok Fowler MD;  Location:  [facility];  Service: Cardiovascular;  Laterality: N/A;    CARDIAC ELECTROPHYSIOLOGY PROCEDURE N/A 10/10/2022    Procedure: ICD implant-gen change [employer] aware;  Surgeon: William Mcintyre MD;  Location:  [facility];  Service: Cardiology;  Laterality: N/A;    CORONARY STENT PLACEMENT      ECHO - CONVERTED  2019    INSERT / REPLACE / REMOVE PACEMAKER      INTERNAL CARDIAC DEFIBRILLATOR INSERTION Left     TOTAL HIP ARTHROPLASTY Right          FAMILY HISTORY  Family History   Problem Relation Age of Onset    Heart disease Father          SOCIAL HISTORY  Social History     Socioeconomic History    Marital status:    Tobacco Use    Smoking status: Never    Smokeless tobacco: Never   Vaping Use    Vaping Use: Never used   Substance and Sexual Activity    Alcohol use: Yes     Comment: occasional     Drug use: No    Sexual activity: Defer         ALLERGIES  Patient has no known allergies.        REVIEW OF SYSTEMS  Review of Systems  Included in HPI  All systems reviewed and negative except for those discussed in HPI.      PHYSICAL EXAM    I have reviewed the triage vital  signs and nursing notes.    ED Triage Vitals   Temp Heart Rate Resp BP SpO2   12/22/23 2010 12/22/23 2010 12/22/23 2010 12/22/23 2027 12/22/23 2010   98.5 °F (36.9 °C) 114 18 (!) 156/105 93 %      Temp src Heart Rate Source Patient Position BP Location FiO2 (%)   12/22/23 2010 12/22/23 2010 12/22/23 2027 12/22/23 2027 --   Tympanic Monitor Sitting Left arm        Physical Exam  GENERAL: Pleasant cooperative and conversant smiling  male, alert, no acute distress  SKIN: Warm, dry  HENT: Normocephalic, atraumatic  EYES: no scleral icterus  CV: regular rhythm, regular rate, no murmur  RESPIRATORY: normal effort, lungs clear, no wheezing, no rhonchi  ABDOMEN: soft, nontender, nondistended  MUSCULOSKELETAL: no deformity, no lower extremity edema  NEURO: alert, moves all extremities, follows commands                                                               LAB RESULTS  Recent Results (from the past 24 hour(s))   ECG 12 Lead ED Triage Standing Order; Chest Pain    Collection Time: 12/22/23  8:15 PM   Result Value Ref Range    QT Interval 359 ms    QTC Interval 477 ms   Comprehensive Metabolic Panel    Collection Time: 12/22/23  8:25 PM    Specimen: Arm, Right; Blood   Result Value Ref Range    Glucose 334 (H) 65 - 99 mg/dL    BUN 14 6 - 20 mg/dL    Creatinine 1.14 0.76 - 1.27 mg/dL    Sodium 137 136 - 145 mmol/L    Potassium 3.9 3.5 - 5.2 mmol/L    Chloride 99 98 - 107 mmol/L    CO2 24.0 22.0 - 29.0 mmol/L    Calcium 9.1 8.6 - 10.5 mg/dL    Total Protein 6.8 6.0 - 8.5 g/dL    Albumin 4.6 3.5 - 5.2 g/dL    ALT (SGPT) 33 1 - 41 U/L    AST (SGOT) 24 1 - 40 U/L    Alkaline Phosphatase 75 39 - 117 U/L    Total Bilirubin 0.3 0.0 - 1.2 mg/dL    Globulin 2.2 gm/dL    A/G Ratio 2.1 g/dL    BUN/Creatinine Ratio 12.3 7.0 - 25.0    Anion Gap 14.0 5.0 - 15.0 mmol/L    eGFR 75.0 >60.0 mL/min/1.73   High Sensitivity Troponin T    Collection Time: 12/22/23  8:25 PM    Specimen: Arm, Right; Blood   Result Value Ref Range    HS  Troponin T 14 <22 ng/L   Green Top (Gel)    Collection Time: 12/22/23  8:25 PM   Result Value Ref Range    Extra Tube Hold for add-ons.    Lavender Top    Collection Time: 12/22/23  8:25 PM   Result Value Ref Range    Extra Tube hold for add-on    Gold Top - SST    Collection Time: 12/22/23  8:25 PM   Result Value Ref Range    Extra Tube Hold for add-ons.    Light Blue Top    Collection Time: 12/22/23  8:25 PM   Result Value Ref Range    Extra Tube Hold for add-ons.    CBC Auto Differential    Collection Time: 12/22/23  8:25 PM    Specimen: Arm, Right; Blood   Result Value Ref Range    WBC 5.31 3.40 - 10.80 10*3/mm3    RBC 5.16 4.14 - 5.80 10*6/mm3    Hemoglobin 15.8 13.0 - 17.7 g/dL    Hematocrit 46.9 37.5 - 51.0 %    MCV 90.9 79.0 - 97.0 fL    MCH 30.6 26.6 - 33.0 pg    MCHC 33.7 31.5 - 35.7 g/dL    RDW 13.2 12.3 - 15.4 %    RDW-SD 43.7 37.0 - 54.0 fl    MPV 10.9 6.0 - 12.0 fL    Platelets 181 140 - 450 10*3/mm3    Neutrophil % 50.6 42.7 - 76.0 %    Lymphocyte % 40.3 19.6 - 45.3 %    Monocyte % 7.2 5.0 - 12.0 %    Eosinophil % 1.5 0.3 - 6.2 %    Basophil % 0.4 0.0 - 1.5 %    Immature Grans % 0.0 0.0 - 0.5 %    Neutrophils, Absolute 2.69 1.70 - 7.00 10*3/mm3    Lymphocytes, Absolute 2.14 0.70 - 3.10 10*3/mm3    Monocytes, Absolute 0.38 0.10 - 0.90 10*3/mm3    Eosinophils, Absolute 0.08 0.00 - 0.40 10*3/mm3    Basophils, Absolute 0.02 0.00 - 0.20 10*3/mm3    Immature Grans, Absolute 0.00 0.00 - 0.05 10*3/mm3    nRBC 0.0 0.0 - 0.2 /100 WBC   Protime-INR    Collection Time: 12/22/23  8:25 PM    Specimen: Arm, Right; Blood   Result Value Ref Range    Protime 12.7 11.7 - 14.2 Seconds    INR 0.94 0.90 - 1.10   BNP    Collection Time: 12/22/23  8:25 PM    Specimen: Arm, Right; Blood   Result Value Ref Range    proBNP <36.0 0.0 - 900.0 pg/mL   Magnesium    Collection Time: 12/22/23  8:25 PM    Specimen: Arm, Right; Blood   Result Value Ref Range    Magnesium 1.5 (L) 1.6 - 2.6 mg/dL   High Sensitivity Troponin T 2Hr     Collection Time: 12/22/23 10:36 PM    Specimen: Blood   Result Value Ref Range    HS Troponin T 15 <22 ng/L    Troponin T Delta 1 >=-4 - <+4 ng/L       I ordered the above labs and independently reviewed the results.        RADIOLOGY  XR Chest 1 View    Result Date: 12/22/2023  SINGLE VIEW OF THE CHEST  HISTORY: Chest pain  COMPARISON: September 26, 2017  FINDINGS: Heart size is within normal limits. No pneumothorax or pleural effusion is seen. Left-sided pacemaker is noted. No acute infiltrates are identified.      No acute findings.  This report was finalized on 12/22/2023 9:09 PM by Dr. Zee Foreman M.D on Workstation: [id number]       I ordered the above noted radiological studies. Reviewed by me. See dictation for official radiology interpretation.      PROCEDURES    Procedures      MEDICATIONS GIVEN IN ER    Medications   aspirin tablet 325 mg (325 mg Oral Given 12/22/23 2039)         ORDERS PLACED DURING THIS VISIT:  Orders Placed This Encounter   Procedures    XR Chest 1 View    [facility] Draw    Comprehensive Metabolic Panel    High Sensitivity Troponin T    CBC Auto Differential    Protime-INR    BNP    Magnesium    High Sensitivity Troponin T 2Hr    Undress & Gown    Continuous Pulse Oximetry    [employer] device needs interrogation  Misc Nursing Order (Specify)    ECG 12 Lead ED Triage Standing Order; Chest Pain    CBC & Differential    Green Top (Gel)    Lavender Top    Gold Top - SST    Light Blue Top         PROGRESS, DATA ANALYSIS, CONSULTS, AND MEDICAL DECISION MAKING    All labs have been independently interpreted by me.  All radiology studies have been reviewed by me.   EKG's independently viewed and interpreted by me.  Discussion below represents my analysis of pertinent findings related to patient's condition, differential diagnosis, treatment plan and final disposition.    MDM patient had an abnormal tone from his device and is here for evaluation of that.  Patient has no other complaints and  has been feeling well.  Possibilities include device dysfunction, battery life issues, arrhythmia, among other possibilities.  Plan to obtain EKG, monitor patient, obtain labs to assess electrolytes, and interrogate patient's device.    ED Course as of 12/23/23 0935   Fri Dec 22, 2023   2258 EKG ER MD interpretation   Time: 20: 15  Rhythm and rate: Sinus tachycardia at a rate of 106 with single PVC   Axis: Normal  P waves: Normal  QRS complexes: Normal  ST segments: no elevation nor depressions  T waves: Inversions in 2, 3, aVF  Q waves in 2, 3, aVF [AR]   Sat Dec 23, 2023   0003 I discussed patient's device with the rep on for [employer] by phone.  She described patient having multiple tachy arrhythmia episodes chronically but that they have been increasing in frequency.  She used November 4 as an example of patient had 7-8 episodes of tacky arrhythmia.  She said the device is set to alarm or notify the patient at certain times when thresholds for these episodes have been reached.  She suspects because patient has had so many episodes of tacky arrhythmia over the last 2 months the patient received a tone that was also sent as an alert to his cardiologist.  She said the device was functioning normally and that he had no suspicious electrical activity tonight.  She recommended he follow-up with Dr. Fowler to discuss device settings.  Patient is amenable to this plan. [AR]      ED Course User Index  [AR] Yanely Rubalcava MD           PPE: I wore and adhered to appropriate PPE per hospital protocols for specific patient presentation. (For respiratory patients with suspected Covid-19 or other infectious etiology suspected for patient's symptoms, the patient wore a mask and I wore an N95 mask throughout the entire patient encounter.) Proper hand hygiene both before and after patient encounter was performed as well.         AS OF 09:35 EST VITALS:    BP - 129/97  HR - 89  TEMP - 98.5 °F (36.9 °C) (Tympanic)  O2 SATS -  96%        DIAGNOSIS  Final diagnoses:   Encounter for checking of automatic implantable cardioverter-defibrillator (AICD)         DISPOSITION  ED Disposition       ED Disposition   Discharge    Condition   Stable    Comment   --                  Note Disclaimer: At [facility], we believe that sharing information builds trust and better relationships. You are receiving this note because you recently visited [facility]. It is possible you will see health information before a provider has talked with you about it. This kind of information can be easy to misunderstand. To help you fully understand what it means for your health, we urge you to discuss this note with your provider.         Yanely Rubalcava MD  12/23/23 3657

## 2023-12-23 NOTE — DISCHARGE INSTRUCTIONS
Rest at home avoiding any particularly strenuous activity until you talk with your cardiologist.     Eat small, frequent meals and drink plenty of fluids. Take all of your regular medications as prescribed previously.      Monitor for any signs of recurrent symptoms or worsening and see your primary doctor to discuss your ER visit while returning to the ER if any concerns as we discussed.

## 2023-12-23 NOTE — ED TRIAGE NOTES
"Pt ambulatory to triage from home with c/o aicd problem - states has implanted defibrillator which \"buzzed\" on him tonight x 2.  Pt states when this has happened in the past, it was because the battery was getting low, but pt doesn't believe it's time for the battery to be going out.  Pt denies cp at this time.   "

## 2023-12-24 LAB
QT INTERVAL: 359 MS
QTC INTERVAL: 477 MS

## 2023-12-28 ENCOUNTER — TELEPHONE (OUTPATIENT)
Dept: CARDIOLOGY | Facility: CLINIC | Age: 58
End: 2023-12-28
Payer: MEDICARE

## 2023-12-28 NOTE — TELEPHONE ENCOUNTER
LEFT PT A VM TO SCHEDULE AN APPT WITH DEVICE CHECK. SUJITNT SEEN PATIENT IN OVER 18 MONTHS. WAS DUE TO COME BACK 9/2022. TRANSFER TO OFFICE.

## 2024-02-21 NOTE — Clinical Note
Patient needs an office follow-up with Dr. Fowler on device interrogation.  He has not been seen since 2022.  Patient lives in [place].  If he prefers to see a cardiologist in the level that is okay but it is important that he follow-up with someone regularly in regard to device function

## 2024-02-21 NOTE — PROGRESS NOTES
Remote device interrogation received    Patient having multiple episodes of tachycardia SVT episodes that are brief but rapid.  He has not been in our office for follow-up since 2022.  We have previous called the patient to schedule but has not occurred.    Will reach out to the patient again to recommend cardiology follow-up

## 2024-02-22 ENCOUNTER — TELEPHONE (OUTPATIENT)
Dept: CARDIOLOGY | Facility: CLINIC | Age: 59
End: 2024-02-22
Payer: MEDICARE

## 2024-02-22 NOTE — TELEPHONE ENCOUNTER
LEFT PT A VM TO SCHEDULE WITH SELVIN PENA AND FOR A [facility] [employer] DEVICE CHECK. SEE PREVIOUS NOTES FROM [facility] FOR HISTORY. TRANSFER TO OFFICE.

## 2024-03-27 ENCOUNTER — TELEPHONE (OUTPATIENT)
Dept: CARDIOLOGY | Facility: CLINIC | Age: 59
End: 2024-03-27
Payer: MEDICARE

## 2024-03-27 NOTE — PROGRESS NOTES
Remote device transmission received.    Patient continues to have episodes of SVT.  We have previously reached out to patient on several occasions to try to schedule an office follow-up.  Will try to recontact patient and send a letter to his home

## 2024-03-27 NOTE — TELEPHONE ENCOUNTER
Called and Lvm to contact the office for an appt. Will also mail out a letter to the address on file.

## 2024-08-14 ENCOUNTER — TELEPHONE (OUTPATIENT)
Dept: CARDIOLOGY | Facility: CLINIC | Age: 59
End: 2024-08-14
Payer: MEDICARE

## 2024-08-14 NOTE — TELEPHONE ENCOUNTER
Patient's remote monitor is not currently transmitting.    We will contact patient to try to restore connection.    After checking all connections if unable to transmit would recommend contacting device company for further assistance

## 2024-08-14 NOTE — LETTER
August 16, 2024     Herve Gill  Herve Gill  [address] [id number]      Dear Mr. Gill:    This letter is a friendly reminder that we have been trying to contact you about your cardiac monitor . Please check all connections on home monitor ensuring all cords are connected properly. Push blue heart button. It should light up a green provider symbol. If it does not light up green in transit and all the way across, we ask that you contact  of monitor to reestablish home monitoring. Please notify [facility] [place] [facility] after troubleshooting with company to assure that device is up and transmitting. Contact for devices are listed below refer to your monitor for correct brand.    [employer]  : [phone]  [employer] : [phone]  [employer] : [phone]    If you've already undergone or scheduled this procedure, please disregard this notice.         Sincerely,        KJ Cao

## (undated) DEVICE — GW PTFE EMERALD HEPCOAT FC J TIP STD .035 3MM 150CM

## (undated) DEVICE — CATH DIAG IMPULSE FR4 6F 100CM

## (undated) DEVICE — 3M™ PATIENT PLATE, CORDED, SPLIT, LARGE, 40 PER CASE, 1179: Brand: 3M™

## (undated) DEVICE — TBG NAMIC PRESS MONTR A/ F/M 12IN

## (undated) DEVICE — PK TRY HEART CATH 50

## (undated) DEVICE — ADHS LIQ MASTISOL 2/3ML

## (undated) DEVICE — PINNACLE INTRODUCER SHEATH: Brand: PINNACLE

## (undated) DEVICE — SUT ETHIB 0/0 MO6 I8IN CX45D

## (undated) DEVICE — PACEMAKER CDS: Brand: MEDLINE INDUSTRIES, INC.

## (undated) DEVICE — ANTIBACTERIAL UNDYED BRAIDED (POLYGLACTIN 910), SYNTHETIC ABSORBABLE SUTURE: Brand: COATED VICRYL

## (undated) DEVICE — CONTRST ISOVUE300 61PCT 50ML

## (undated) DEVICE — 3M™ IOBAN™ 2 ANTIMICROBIAL INCISE DRAPE 6650EZ: Brand: IOBAN™ 2

## (undated) DEVICE — Device

## (undated) DEVICE — 6F .070 XB 3.5 100CM: Brand: VISTA BRITE TIP

## (undated) DEVICE — CABL BIPOL W/ALLGTR CLIP/SM 12FT

## (undated) DEVICE — ELECTRD DEFIB M/FUNC PROPADZ RADIOL 2PK

## (undated) DEVICE — HI-TORQUE BALANCE MIDDLEWEIGHT UNIVERSAL II GUIDE WIRE STRAIGHT TIP PAK  190 CM: Brand: HI-TORQUE BALANCE MIDDLEWEIGHT UNIVERSAL II

## (undated) DEVICE — STPCK 3WY HP ROT

## (undated) DEVICE — SYR LL TP 10ML STRL

## (undated) DEVICE — VIOLET BRAIDED (POLYGLACTIN 910), SYNTHETIC ABSORBABLE SUTURE: Brand: COATED VICRYL

## (undated) DEVICE — CATH DIAG IMPULSE FL4 6F 100CM

## (undated) DEVICE — CATH DIAG IMPULSE PIG .056 6F 110CM

## (undated) DEVICE — DRSNG WND BORDR/ADHS NONADHR/GZ LF 4X4IN STRL

## (undated) DEVICE — PLASMABLADE PS200-040 4.0: Brand: PLASMABLADE™

## (undated) DEVICE — STCK MANICURE 144BX

## (undated) DEVICE — DEV INFL COMPAK W/ACCESSPLUS IN4530